# Patient Record
Sex: FEMALE | Race: WHITE | NOT HISPANIC OR LATINO | Employment: FULL TIME | URBAN - METROPOLITAN AREA
[De-identification: names, ages, dates, MRNs, and addresses within clinical notes are randomized per-mention and may not be internally consistent; named-entity substitution may affect disease eponyms.]

---

## 2017-01-09 ENCOUNTER — HOSPITAL ENCOUNTER (OUTPATIENT)
Dept: RADIOLOGY | Facility: MEDICAL CENTER | Age: 37
End: 2017-01-09
Attending: FAMILY MEDICINE
Payer: COMMERCIAL

## 2017-01-09 DIAGNOSIS — R51.9 HEADACHE, UNSPECIFIED HEADACHE TYPE: ICD-10-CM

## 2017-01-09 PROCEDURE — A9579 GAD-BASE MR CONTRAST NOS,1ML: HCPCS | Performed by: FAMILY MEDICINE

## 2017-01-09 PROCEDURE — 700117 HCHG RX CONTRAST REV CODE 255: Performed by: FAMILY MEDICINE

## 2017-01-09 PROCEDURE — 70553 MRI BRAIN STEM W/O & W/DYE: CPT

## 2017-01-09 RX ADMIN — GADODIAMIDE 15 ML: 287 INJECTION INTRAVENOUS at 13:56

## 2017-03-27 ENCOUNTER — OFFICE VISIT (OUTPATIENT)
Dept: NEUROLOGY | Facility: MEDICAL CENTER | Age: 37
End: 2017-03-27
Payer: COMMERCIAL

## 2017-03-27 VITALS
TEMPERATURE: 98.4 F | HEIGHT: 63 IN | OXYGEN SATURATION: 93 % | DIASTOLIC BLOOD PRESSURE: 62 MMHG | SYSTOLIC BLOOD PRESSURE: 100 MMHG | BODY MASS INDEX: 27.29 KG/M2 | WEIGHT: 154 LBS | HEART RATE: 77 BPM

## 2017-03-27 DIAGNOSIS — H53.19: Primary | ICD-10-CM

## 2017-03-27 DIAGNOSIS — R20.2 PARESTHESIA OF BOTH HANDS: ICD-10-CM

## 2017-03-27 PROCEDURE — 99244 OFF/OP CNSLTJ NEW/EST MOD 40: CPT | Performed by: PSYCHIATRY & NEUROLOGY

## 2017-03-27 ASSESSMENT — ENCOUNTER SYMPTOMS
FOCAL WEAKNESS: 0
BLURRED VISION: 1
TINGLING: 1
SENSORY CHANGE: 1
PHOTOPHOBIA: 0
LOSS OF CONSCIOUSNESS: 0
HEADACHES: 0
DOUBLE VISION: 0

## 2017-03-27 NOTE — MR AVS SNAPSHOT
"        Gasper Live   3/27/2017 3:20 PM   Office Visit   MRN: [id number]    Department:  [facility]   Dept Phone:  [phone]    Description:  Female : 1980   Provider:  Braulio Da Silva M.D.           Reason for Visit     New Patient headache, numbness and tingling in hands and back      Allergies as of 3/27/2017     Allergen Noted Reactions    Nkda [No Known Drug Allergy] 2007         Vital Signs     Blood Pressure Pulse Temperature Height Weight Body Mass Index    100/62 mmHg 77 36.9 °C (98.4 °F) 1.6 m (5' 2.99\") 69.854 kg (154 lb) 27.29 kg/m2    Oxygen Saturation Smoking Status                93% Never Smoker           Basic Information     Date Of Birth Sex Race Ethnicity Preferred Language    1980 Female White Non- English      Problem List              ICD-10-CM Priority Class Noted - Resolved    Uterine polyp N84.0   2015 - Present    Twin dichorionic diamniotic placenta O30.049   2016 - Present    Polyhydramnios O40.9XX0   2016 - Present    Premature labor O60.00   2016 - Present     premature rupture of membranes in third trimester O42.913   2016 - Present    Multiple gestation with malpresentation of one fetus or more, delivered O30.90, O32.9XX0   2016 - Present    Benign gestational thrombocytopenia in third trimester (CMS-HCC) O99.113, D69.6   2016 - Present      Health Maintenance        Date Due Completion Dates    IMM DTaP/Tdap/Td Vaccine (2 - Td) 2018    PAP SMEAR 2019, 2015            Current Immunizations     Hep A/HEP B Combined Vaccine (TwinRix) 2010, 2008    Hepatitis B Vaccine Non-Recombivax (Ped/Adol) 2011 11:30 AM    Influenza TIV (IM) 10/30/2013, 2013    Influenza Vaccine Quad Inj (Pf) 2016    Influenza Vaccine Quad Inj (Preserved) 10/13/2015, 2014    MMR Vaccine 1995, 1981    MMR/Varicella Combined Vaccine  Incomplete    Tdap Vaccine  " Incomplete, 9/2/2008    Tuberculin Skin Test 3/19/2014  7:15 AM, 4/24/2013 12:20 PM, 5/7/2012 12:30 PM, 5/9/2011  3:45 PM      Below and/or attached are the medications your provider expects you to take. Review all of your home medications and newly ordered medications with your provider and/or pharmacist. Follow medication instructions as directed by your provider and/or pharmacist. Please keep your medication list with you and share with your provider. Update the information when medications are discontinued, doses are changed, or new medications (including over-the-counter products) are added; and carry medication information at all times in the event of emergency situations     Allergies:  NKDA - (reactions not documented)               Medications  Valid as of: March 27, 2017 -  4:07 PM    Generic Name Brand Name Tablet Size Instructions for use    Acetaminophen (Tab) Acetaminophen 650 MG Take 650 mg by mouth every four hours as needed for Fever (> 100.4 F).        Docusate Sodium (Cap)  MG Take 100 mg by mouth 2 times a day as needed for Constipation.        Ferrous Sulfate (Tab) ferrous sulfate 325 (65 FE) MG Take 1 Tab by mouth 2 times a day, with meals.        Levothyroxine Sodium (Tab) SYNTHROID 50 MCG Take 1 Tab by mouth every day.        Multiple Vitamins-Minerals   Take  by mouth every day.        .                 Medicines prescribed today were sent to:     [facility] #[id number] [place], [place] - [address]    [address] [place] [address]    Phone: [phone] Fax: [phone]    Open 24 Hours?: No      Medication refill instructions:       If your prescription bottle indicates you have medication refills left, it is not necessary to call your provider’s office. Please contact your pharmacy and they will refill your medication.    If your prescription bottle indicates you do not have any refills left, you may request refills at any time through one of the following  ways: The online [facility] system (except Urgent Care), by calling your provider’s office, or by asking your pharmacy to contact your provider’s office with a refill request. Medication refills are processed only during regular business hours and may not be available until the next business day. Your provider may request additional information or to have a follow-up visit with you prior to refilling your medication.   *Please Note: Medication refills are assigned a new Rx number when refilled electronically. Your pharmacy may indicate that no refills were authorized even though a new prescription for the same medication is available at the pharmacy. Please request the medicine by name with the pharmacy before contacting your provider for a refill.           [facility] Access Code: Activation code not generated  Current [facility] Status: Active

## 2017-03-27 NOTE — PROGRESS NOTES
"Subjective:      Gasper Live is a 37 y.o. female who presents from the office of , for consultation, with a history of transient episodes of visual distortion and head discomfort, and separate episodes of bilateral hand paresthesias.     HPI    Patient is a pleasant 37-year-old right-handed  female whose episodes of visual distortion and head discomfort began about 2 or 3 years ago, but which had increased in frequency over the last month. Between the episodes she is in her usual state of health without sequelae. She describes a sudden onset of visual distortions as scotomata and floaters. There is no visual loss. It appears to be binocular. After 30 minutes she describes a mild \"brain freeze\" with some head discomfort, not an actual headache, with heightened sensitivity to light and some mildly impaired concentration. She never loses consciousness. The whole episode from start to finish last about one hour. There are no sequelae. One thing first started, they actually resolved spontaneously but then started up again about 9 months ago. She has been averaging a once a month frequency, but over the last week she actually had 3 of these events. There is no diurnal pattern to when they begin, she has not been able to identify triggers. Her workup included MRI scan of the brain with and without contrast, I reviewed the images myself, these were completely within normal limits, revealing only a retention cyst in the maxillary sinus.    The hand paresthesias started separately, almost 5 years ago. She is aware of the hands and fingers bilaterally as being involved, quite often in the morning hours. She awakens, shakes things out, and they improve. There is no actual weakness. This can be associated with some numbness just inferior to the right shoulder blade. There was no actual neck pain with symptoms radiating into either arm and she denies Lhermitte phenomena. Weightlifting also seems to make " the hand symptoms more intense. She denies sensory loss, loss of strength or dexterity. The symptoms occur independently of the visual episodes as described above. These are fluctuating in occurrence, they don't seem to have an escalating pattern to them. She denies any sensory distortions in the lower extremities. There is no pain or swelling at the hands or wrists and elbows bilaterally. There has been no directed workup.    She has a history of migraine headaches in the distant past, these were not associated with any aura symptomatology. They started in college, never triggered by certain circumstance consistently, there is never a diurnal pattern to when they did occur. They typically lasted one day, would always resolve. She has hypothyroidism, otherwise no history of diabetes, malignancy, hypertension, CAD, PVD, CVA, demyelinating disease, neurodegenerative disease, autoimmune disease, liver or kidney disease, asthma, blood dyscrasia or psychiatric disease. There is no surgical history of note. She knows little of her father's medical history, her mother is alive and well. Her one sister is also alive and well. No one in the family has a history of MS or migraine headaches that she is aware of. Her menses are about once every 6 weeks, they last 4 days. She rarely drinks alcohol, does not smoke. She works as an ICU nurse. Her , Trevin, is also an ICU nurse. About 9 months ago she gave birth to fraternal twins. During the pregnancy she was not aware if any of the symptoms she is seeing me for today were worsened.    She is on Synthroid, iron supplement and multivitamin daily.    Review of Systems   Constitutional: Negative for malaise/fatigue.   Eyes: Positive for blurred vision. Negative for double vision and photophobia.   Neurological: Positive for tingling and sensory change. Negative for focal weakness, loss of consciousness and headaches.   All other systems reviewed and are negative.        "Objective:     /62 mmHg  Pulse 77  Temp(Src) 36.9 °C (98.4 °F)  Ht 1.6 m (5' 2.99\")  Wt 69.854 kg (154 lb)  BMI 27.29 kg/m2  SpO2 93%     Physical Exam    She appears in no acute distress. Vital signs are stable. There is no malar rash, jaw or temporal tenderness, jaw claudication, or allodynia. The neck is supple, carotid pulses are present bilaterally without asymmetry or bruits. Cardiac evaluation reveals a regular rhythm. There is minimal tenderness over the medial epicondyles at both elbows, there is no tenderness of the wrists, compression maneuvers at the wrists are also negative bilaterally. There is no evidence of bruising, rash, or edema.    Fully oriented, there is no aphasia, agnosia, apraxia, or inattention.    PERRLA/EOMI, visual fields are full to finger counting on confrontation both eyes, funduscopic exam reveals sharp disc margins bilaterally, facial movements are symmetric, jaw jerk is absent, the tendon uvula midline, sensory exam is intact across the midline to light touch and temperature. Shoulder shrug is symmetric.    Tone is normal, there is no tremor, asterixis, or drift. Strength is 5/5 in all muscle groups with attention paid to the hand intrinsic musculatures bilaterally. There is no atrophy. Reflexes are brisk but symmetric, present at all points in all 4 extremities, both toes are downgoing.    Repetitive movements with the hands, fingers and feet are intact and symmetric with maintain amplitude and frequencies. There is no appendicular dystaxia. She walks with normal station, arm swing is symmetric, heel, toe, and tandem walking are intact.    Sensory exam is intact to vibration and temperature, Romberg is absent.     Assessment/Plan:     1. Other visual distortions and entoptic phenomena  I suspect these are migrainous epiphenomena, they are not harbingers of demyelinating disease, and MS is even less likely within normal imaging study. What is a little unusual is the " absence of a clear history of migraine with aura though she does have a history of migraine headaches in the past, and they were infrequent as well. For now, I would simply recommend observation. I don't think a consultation with an ophthalmologist is necessary. I doubt that these visual episodes or manifestation of some systemic vasculitic or autoimmune process, though I would need to defer further workup to her primary care physician if it was deemed appropriate. If migraine, symptomatic relief can be offered moving forwards, but she would like to hold on this. As well, I don't think we are seeing manifestations of occipital lobe epilepsy, vertebrobasilar steal, etc.    2. Paresthesia of both hands  I suspect this is more a focal compression process at the wrists, likely median neuritis, though I suppose a bilateral cervical radiculopathic process could be entertained. The symptoms are minimal, there are no abnormalities on exam, so neurophysiologic studies are not likely to prove abnormal. Treatment recommendation would still be the same and would include hand therapies, anti-inflammatories, oral steroids, etc. For now I don't think these are warranted. The fact that symptoms awaken her from sleep are consistent with a compression process at the wrists, she is at risk given the nature of her work as a nurse, also because she does weight lift. There is nothing on exam to suggest she has a medical condition that would predispose to compression neuropathy, such as autoimmune disease, diabetes, thyroid disease, malignancy, etc., she is not showing signs of a radiculomyelopathy as well, to imaging of the cervical spine can be held.    Face-to-face time was spent with the patient reviewing all of the above. For now we will simply follow along, she can call the office into the future depending on need and symptom recurrence/progression.  Time: Evaluation of 60 minutes for exam, review, discussion, education  Discussion:  As mentioned in the assessment, over 50% of the time spent face-to-face counseling and coordinating care

## 2017-07-03 ENCOUNTER — HOSPITAL ENCOUNTER (OUTPATIENT)
Dept: RADIOLOGY | Facility: MEDICAL CENTER | Age: 37
End: 2017-07-03
Attending: FAMILY MEDICINE
Payer: COMMERCIAL

## 2017-07-03 DIAGNOSIS — Z12.31 VISIT FOR SCREENING MAMMOGRAM: ICD-10-CM

## 2017-07-03 PROCEDURE — 77063 BREAST TOMOSYNTHESIS BI: CPT

## 2017-09-20 ENCOUNTER — EH NON-PROVIDER (OUTPATIENT)
Dept: OCCUPATIONAL MEDICINE | Facility: CLINIC | Age: 37
End: 2017-09-20

## 2017-09-20 DIAGNOSIS — Z29.89 NEED FOR ISOLATION: ICD-10-CM

## 2017-09-20 PROCEDURE — 94375 RESPIRATORY FLOW VOLUME LOOP: CPT

## 2017-11-09 ENCOUNTER — OFFICE VISIT (OUTPATIENT)
Dept: URGENT CARE | Facility: CLINIC | Age: 37
End: 2017-11-09
Payer: COMMERCIAL

## 2017-11-09 VITALS
TEMPERATURE: 99.5 F | OXYGEN SATURATION: 100 % | SYSTOLIC BLOOD PRESSURE: 106 MMHG | RESPIRATION RATE: 16 BRPM | HEIGHT: 62 IN | WEIGHT: 155 LBS | HEART RATE: 84 BPM | DIASTOLIC BLOOD PRESSURE: 60 MMHG | BODY MASS INDEX: 28.52 KG/M2

## 2017-11-09 DIAGNOSIS — H66.002 ACUTE SUPPURATIVE OTITIS MEDIA OF LEFT EAR WITHOUT SPONTANEOUS RUPTURE OF TYMPANIC MEMBRANE, RECURRENCE NOT SPECIFIED: ICD-10-CM

## 2017-11-09 PROCEDURE — 99214 OFFICE O/P EST MOD 30 MIN: CPT | Performed by: PHYSICIAN ASSISTANT

## 2017-11-09 RX ORDER — AMOXICILLIN AND CLAVULANATE POTASSIUM 875; 125 MG/1; MG/1
1 TABLET, FILM COATED ORAL 2 TIMES DAILY
Qty: 14 TAB | Refills: 0 | Status: SHIPPED | OUTPATIENT
Start: 2017-11-09 | End: 2017-11-16

## 2017-11-09 RX ORDER — HYDROCODONE BITARTRATE AND ACETAMINOPHEN 5; 325 MG/1; MG/1
1-2 TABLET ORAL EVERY 4 HOURS PRN
Qty: 10 TAB | Refills: 0 | Status: SHIPPED | OUTPATIENT
Start: 2017-11-09 | End: 2018-04-12

## 2017-11-09 ASSESSMENT — ENCOUNTER SYMPTOMS
SORE THROAT: 1
SHORTNESS OF BREATH: 0
PALPITATIONS: 0
EYE PAIN: 0
COUGH: 0
CHILLS: 0
FEVER: 0

## 2017-11-09 ASSESSMENT — PAIN SCALES - GENERAL: PAINLEVEL: 10=SEVERE PAIN

## 2017-11-09 NOTE — LETTER
November 9, 2017         Patient: Gasper Live   YOB: 1980   Date of Visit: 11/9/2017           To Whom it May Concern:    Gasper Live was seen in my clinic on 11/9/2017. Please excuse her from work 11/9-11/11/2017.    If you have any questions or concerns, please don't hesitate to call.        Sincerely,           Usman Marion P.A.-C.  Electronically Signed

## 2017-11-09 NOTE — PROGRESS NOTES
Subjective:      Gasper Live is a 37 y.o. female who presents with Otalgia (has been fighting a could x 1 week and ear pain began today, chills and bodyaches )            Otalgia    There is pain in the left ear. This is a new problem. The current episode started today. The problem occurs constantly. The problem has been unchanged. The pain is at a severity of 10/10. Associated symptoms include a sore throat. Pertinent negatives include no coughing, ear discharge or hearing loss. She has tried NSAIDs for the symptoms. The treatment provided mild relief.       Review of Systems   Constitutional: Negative for chills and fever.   HENT: Positive for congestion, ear pain and sore throat. Negative for ear discharge, hearing loss and tinnitus.    Eyes: Negative for pain.   Respiratory: Negative for cough and shortness of breath.    Cardiovascular: Negative for chest pain and palpitations.   All other systems reviewed and are negative.    PMH:  has a past medical history of Anemia; Benign gestational thrombocytopenia in third trimester (CMS-HCC) (7/6/2016); and Disorder of thyroid.  MEDS:   Current Outpatient Prescriptions:   •  amoxicillin-clavulanate (AUGMENTIN) 875-125 MG Tab, Take 1 Tab by mouth 2 times a day for 7 days., Disp: 14 Tab, Rfl: 0  •  hydrocodone-acetaminophen (NORCO) 5-325 MG Tab per tablet, Take 1-2 Tabs by mouth every four hours as needed., Disp: 10 Tab, Rfl: 0  •  levothyroxine (SYNTHROID) 50 MCG Tab, Take 1 Tab by mouth every day., Disp: 90 Tab, Rfl: 3  •  Multiple Vitamins-Minerals (MULTIVITAMIN PO), Take  by mouth every day., Disp: , Rfl:   •  acetaminophen 650 MG Tab, Take 650 mg by mouth every four hours as needed for Fever (> 100.4 F)., Disp: 30 Tab, Rfl: 0  •  ferrous sulfate 325 (65 FE) MG tablet, Take 1 Tab by mouth 2 times a day, with meals., Disp: 30 Tab, Rfl: 0  •  docusate sodium 100 MG Cap, Take 100 mg by mouth 2 times a day as needed for Constipation., Disp: 60 Cap, Rfl:  "3  ALLERGIES:   Allergies   Allergen Reactions   • Nkda [No Known Drug Allergy]      SURGHX:   Past Surgical History:   Procedure Laterality Date   • PRIMARY C SECTION  7/2/2016    Procedure: PRIMARY C SECTION;  Surgeon: Zoë Leiva M.D.;  Location: LABOR AND DELIVERY;  Service:    • HYSTEROSCOPY WITH VIDEO DIAGNOSTIC  11/5/2015    Procedure: HYSTEROSCOPY WITH VIDEO DIAGNOSTIC with polypectomy;  Surgeon: Arpit Gorman M.D.;  Location: SURGERY [facility];  Service:      SOCHX:  reports that she has never smoked. She has never used smokeless tobacco. She reports that she drinks about 1.2 oz of alcohol per week . She reports that she does not use drugs.  FH: Family history was reviewed, no pertinent findings to report  Medications, Allergies, and current problem list reviewed today in Epic       Objective:     /60   Pulse 84   Temp 37.5 °C (99.5 °F)   Resp 16   Ht 1.575 m (5' 2\")   Wt 70.3 kg (155 lb)   SpO2 100%   Breastfeeding? No   BMI 28.35 kg/m²      Physical Exam   Constitutional: She is oriented to person, place, and time. She appears well-developed and well-nourished. She is active.  Non-toxic appearance. She does not have a sickly appearance. She does not appear ill. No distress.   HENT:   Head: Normocephalic and atraumatic.   Right Ear: Hearing, tympanic membrane, external ear and ear canal normal.   Left Ear: Hearing, external ear and ear canal normal. Tympanic membrane is erythematous and bulging.   Nose: Nose normal.   Mouth/Throat: Uvula is midline, oropharynx is clear and moist and mucous membranes are normal.   Eyes: Conjunctivae and lids are normal. Right eye exhibits no discharge. Left eye exhibits no discharge.   Neck: Normal range of motion. Neck supple.   Cardiovascular: Normal rate, regular rhythm and normal heart sounds.  Exam reveals no gallop and no friction rub.    No murmur heard.  Pulmonary/Chest: Effort normal and breath sounds normal. No respiratory " distress. She has no decreased breath sounds. She has no wheezes. She has no rhonchi. She has no rales. She exhibits no tenderness.   Musculoskeletal: Normal range of motion.   Neurological: She is alert and oriented to person, place, and time.   Skin: Skin is warm, dry and intact.   Psychiatric: She has a normal mood and affect. Her speech is normal and behavior is normal. Judgment and thought content normal.   Vitals reviewed.              Assessment/Plan:     1. Acute suppurative otitis media of left ear without spontaneous rupture of tympanic membrane, recurrence not specified    - amoxicillin-clavulanate (AUGMENTIN) 875-125 MG Tab; Take 1 Tab by mouth 2 times a day for 7 days.  Dispense: 14 Tab; Refill: 0  - hydrocodone-acetaminophen (NORCO) 5-325 MG Tab per tablet; Take 1-2 Tabs by mouth every four hours as needed.  Dispense: 10 Tab; Refill: 0    Differential diagnosis, natural history, supportive care, and indications for immediate follow-up discussed at length.   Follow-up with primary care provider within 4-5 days, emergency room precautions discussed.  Patient and/or family appears understanding of information.

## 2017-11-09 NOTE — PATIENT INSTRUCTIONS

## 2017-11-22 ENCOUNTER — EH NON-PROVIDER (OUTPATIENT)
Dept: OCCUPATIONAL MEDICINE | Facility: CLINIC | Age: 37
End: 2017-11-22

## 2017-11-22 DIAGNOSIS — Z23 NEED FOR VACCINATION: ICD-10-CM

## 2017-11-22 PROCEDURE — 90686 IIV4 VACC NO PRSV 0.5 ML IM: CPT | Performed by: PREVENTIVE MEDICINE

## 2018-01-10 ENCOUNTER — HOSPITAL ENCOUNTER (OUTPATIENT)
Facility: MEDICAL CENTER | Age: 38
End: 2018-01-10
Attending: OBSTETRICS & GYNECOLOGY
Payer: COMMERCIAL

## 2018-01-10 PROCEDURE — 87624 HPV HI-RISK TYP POOLED RSLT: CPT

## 2018-01-10 PROCEDURE — 88175 CYTOPATH C/V AUTO FLUID REDO: CPT

## 2018-01-16 LAB — CYTOLOGY REG CYTOL: NORMAL

## 2018-01-19 LAB
HPV HR 12 DNA CVX QL NAA+PROBE: NEGATIVE
HPV16 DNA SPEC QL NAA+PROBE: NEGATIVE
HPV18 DNA SPEC QL NAA+PROBE: NEGATIVE
SPECIMEN SOURCE: NORMAL

## 2018-01-25 ENCOUNTER — HOSPITAL ENCOUNTER (OUTPATIENT)
Facility: MEDICAL CENTER | Age: 38
End: 2018-01-25
Payer: COMMERCIAL

## 2018-01-25 LAB
EST. AVERAGE GLUCOSE BLD GHB EST-MCNC: 103 MG/DL
HBA1C MFR BLD: 5.2 % (ref 0–5.6)

## 2018-01-25 PROCEDURE — 80061 LIPID PANEL: CPT

## 2018-01-25 PROCEDURE — 82947 ASSAY GLUCOSE BLOOD QUANT: CPT

## 2018-01-25 PROCEDURE — 83036 HEMOGLOBIN GLYCOSYLATED A1C: CPT

## 2018-01-25 PROCEDURE — 83695 ASSAY OF LIPOPROTEIN(A): CPT

## 2018-01-25 PROCEDURE — 83704 LIPOPROTEIN BLD QUAN PART: CPT

## 2018-01-26 LAB
CHOLEST SERPL-MCNC: 183 MG/DL (ref 100–199)
GLUCOSE SERPL-MCNC: 88 MG/DL (ref 65–99)
HDLC SERPL-MCNC: 65 MG/DL
LDLC SERPL CALC-MCNC: 105 MG/DL
TRIGL SERPL-MCNC: 65 MG/DL (ref 0–149)

## 2018-01-27 LAB — LPA SERPL-MCNC: 10 MG/DL

## 2018-01-31 LAB
CHOLEST SERPL-MCNC: 195 MG/DL
HDL PARTICAL NO Q4363: 40.8 UMOL/L
HDL SIZE Q4361: 9.2 NM
HDLC SERPL-MCNC: 71 MG/DL (ref 40–59)
HLD.LARGE SERPL-SCNC: 9 UMOL/L
L VLDL PART NO Q4357: <1.5 NMOL/L
LDL SERPL QN: 21.2 NM
LDL SERPL-SCNC: 1349 NMOL/L
LDL SMALL SERPL-SCNC: 332 NMOL/L
LDLC SERPL CALC-MCNC: 109 MG/DL
PATHOLOGY STUDY: ABNORMAL
TRIGL SERPL-MCNC: 75 MG/DL (ref 30–149)
VLDL SIZE Q4362: 44.3 NM

## 2018-02-06 ENCOUNTER — TELEPHONE (OUTPATIENT)
Dept: NEUROLOGY | Facility: MEDICAL CENTER | Age: 38
End: 2018-02-06

## 2018-02-06 NOTE — TELEPHONE ENCOUNTER
Received a phone call from patient for Dr. Da Silva stating she would like to try Imitrex for her migraines. She has not been seen since March 2017. I booked her for an appointment and she is currently scheduled to see the doctor in April. Is this something we can assist her with in the interim or does she need to wait for her appointment in April? Please advise.

## 2018-03-14 ENCOUNTER — OFFICE VISIT (OUTPATIENT)
Dept: URGENT CARE | Facility: CLINIC | Age: 38
End: 2018-03-14
Payer: COMMERCIAL

## 2018-03-14 VITALS
SYSTOLIC BLOOD PRESSURE: 100 MMHG | TEMPERATURE: 97.8 F | HEIGHT: 62 IN | DIASTOLIC BLOOD PRESSURE: 68 MMHG | BODY MASS INDEX: 28.52 KG/M2 | WEIGHT: 155 LBS | OXYGEN SATURATION: 99 % | HEART RATE: 67 BPM | RESPIRATION RATE: 14 BRPM

## 2018-03-14 DIAGNOSIS — J06.9 VIRAL URI: ICD-10-CM

## 2018-03-14 DIAGNOSIS — J00 NASOPHARYNGITIS: ICD-10-CM

## 2018-03-14 PROCEDURE — 99213 OFFICE O/P EST LOW 20 MIN: CPT | Performed by: NURSE PRACTITIONER

## 2018-03-14 ASSESSMENT — PATIENT HEALTH QUESTIONNAIRE - PHQ9: CLINICAL INTERPRETATION OF PHQ2 SCORE: 0

## 2018-03-14 ASSESSMENT — ENCOUNTER SYMPTOMS
FEVER: 0
COUGH: 1
SINUS PRESSURE: 1
SORE THROAT: 1

## 2018-03-14 NOTE — PROGRESS NOTES
"Subjective:      Gasper Live is a 37 y.o. female who presents with Sinus Problem            Sinus Problem   This is a new problem. Episode onset: Pt reports she developed sinus congestion and mild ST 3 days ago. She is only coughing a little. Denies any recent fevers. She is fatigued. The problem is unchanged. There has been no fever. Associated symptoms include congestion, coughing, sinus pressure and a sore throat. Treatments tried: OTC mucinex and DayQuil. The treatment provided no relief.       Review of Systems   Constitutional: Positive for malaise/fatigue. Negative for fever.   HENT: Positive for congestion, sinus pressure and sore throat.    Respiratory: Positive for cough.    All other systems reviewed and are negative.    Past Medical History:   Diagnosis Date   • Anemia    • Benign gestational thrombocytopenia in third trimester (CMS-HCC) 7/6/2016   • Disorder of thyroid       Past Surgical History:   Procedure Laterality Date   • PRIMARY C SECTION  7/2/2016    Procedure: PRIMARY C SECTION;  Surgeon: Zoë Leiva M.D.;  Location: LABOR AND DELIVERY;  Service:    • HYSTEROSCOPY WITH VIDEO DIAGNOSTIC  11/5/2015    Procedure: HYSTEROSCOPY WITH VIDEO DIAGNOSTIC with polypectomy;  Surgeon: Arpit Gorman M.D.;  Location: SURGERY [facility];  Service:       Social History     Social History   • Marital status: Single     Spouse name: N/A   • Number of children: N/A   • Years of education: N/A     Occupational History   • Not on file.     Social History Main Topics   • Smoking status: Never Smoker   • Smokeless tobacco: Never Used   • Alcohol use 1.2 oz/week     2 Standard drinks or equivalent per week   • Drug use: No   • Sexual activity: Yes     Partners: Male     Other Topics Concern   • Not on file     Social History Narrative   • No narrative on file          Objective:     /68   Pulse 67   Temp 36.6 °C (97.8 °F)   Resp 14   Ht 1.575 m (5' 2\")   Wt 70.3 kg (155 lb)   SpO2 " 99%   BMI 28.35 kg/m²      Physical Exam   Constitutional: She is oriented to person, place, and time. Vital signs are normal. She appears well-developed and well-nourished.   HENT:   Head: Normocephalic and atraumatic.   Right Ear: Tympanic membrane and external ear normal.   Left Ear: Tympanic membrane and external ear normal.   Nose: Rhinorrhea present.   Mouth/Throat: Oropharynx is clear and moist.   Eyes: EOM are normal. Pupils are equal, round, and reactive to light.   Neck: Normal range of motion.   Cardiovascular: Normal rate and regular rhythm.    Pulmonary/Chest: Effort normal and breath sounds normal.   Musculoskeletal: Normal range of motion.   Lymphadenopathy:        Head (right side): Submandibular adenopathy present.        Head (left side): Submandibular adenopathy present.   Neurological: She is alert and oriented to person, place, and time.   Skin: Skin is warm and dry. Capillary refill takes less than 2 seconds.   Psychiatric: She has a normal mood and affect. Her speech is normal and behavior is normal. Thought content normal.   Vitals reviewed.              Assessment/Plan:     1. Viral URI    2. Nasopharyngitis    Discussed with patient symptoms are viral in nature. She needs to continue OTC therapies for at least another 7 days before her symptoms would be consistent with any bacterial infection, she understands  Increase water intake  Plenty of rest  Tylenol and Ibuprofen PRN pain  Supportive care, differential diagnoses, and indications for immediate follow-up discussed with patient.    Pathogenesis of diagnosis discussed including typical length and natural progression.      Instructed to return to  or nearest emergency department if symptoms fail to improve, for any change in condition, further concerns, or new concerning symptoms.  Patient states understanding of the plan of care and discharge instructions.

## 2018-03-27 ENCOUNTER — HOSPITAL ENCOUNTER (OUTPATIENT)
Facility: MEDICAL CENTER | Age: 38
End: 2018-03-27
Attending: OBSTETRICS & GYNECOLOGY
Payer: COMMERCIAL

## 2018-03-27 PROCEDURE — 88305 TISSUE EXAM BY PATHOLOGIST: CPT

## 2018-04-12 ENCOUNTER — OFFICE VISIT (OUTPATIENT)
Dept: NEUROLOGY | Facility: MEDICAL CENTER | Age: 38
End: 2018-04-12
Payer: COMMERCIAL

## 2018-04-12 VITALS
OXYGEN SATURATION: 98 % | WEIGHT: 148.81 LBS | TEMPERATURE: 98 F | HEART RATE: 73 BPM | BODY MASS INDEX: 28.1 KG/M2 | SYSTOLIC BLOOD PRESSURE: 102 MMHG | RESPIRATION RATE: 16 BRPM | DIASTOLIC BLOOD PRESSURE: 58 MMHG | HEIGHT: 61 IN

## 2018-04-12 DIAGNOSIS — G43.109 MIGRAINE WITH AURA AND WITHOUT STATUS MIGRAINOSUS, NOT INTRACTABLE: Primary | ICD-10-CM

## 2018-04-12 PROCEDURE — 99214 OFFICE O/P EST MOD 30 MIN: CPT | Performed by: PSYCHIATRY & NEUROLOGY

## 2018-04-12 RX ORDER — ELETRIPTAN HYDROBROMIDE 40 MG/1
TABLET, FILM COATED ORAL
Qty: 9 TAB | Refills: 4 | Status: SHIPPED | OUTPATIENT
Start: 2018-04-12 | End: 2018-10-15

## 2018-04-12 ASSESSMENT — ENCOUNTER SYMPTOMS
CONSTIPATION: 0
MEMORY LOSS: 0
PHOTOPHOBIA: 0
SENSORY CHANGE: 0
DEPRESSION: 0
LOSS OF CONSCIOUSNESS: 0
HEADACHES: 0

## 2018-04-12 ASSESSMENT — PAIN SCALES - GENERAL: PAINLEVEL: NO PAIN

## 2018-04-13 NOTE — PROGRESS NOTES
"Subjective:      Gasper Live is a 38 y.o. female who presents for follow-up with a history of migraine with aura.     HPI    Last seen more than one year ago, she had been having visual distortions lasting upwards of 30 minutes, these are unassociated with headache pain. She has a history of migraine without aura. It was suspected this was simply migrainous epiphenomena, workup for organic causes of visual distortions was ruled out, the rest were simply observing for, absent other indicators. In the last 4 months, she actually had an increase in headaches, now visual aura symptoms consistently lasting 30 minutes and then followed by severe migraine headache. The headaches last maybe a day or so, she was taking only OTC medicines. She was having at least one headache attack every week for the month of February, things have attenuated on their own, she has maybe had one headache attack in the last 6 weeks.    Medical, surgical and family histories are reviewed in electronic health record, there are no known drug allergies. She remains on Synthroid and multivitamins.    Review of Systems   Eyes: Negative for photophobia.   Gastrointestinal: Negative for constipation.   Neurological: Negative for sensory change, loss of consciousness and headaches.   Psychiatric/Behavioral: Negative for depression and memory loss.   All other systems reviewed and are negative.       Objective:     /58   Pulse 73   Temp 36.7 °C (98 °F)   Resp 16   Ht 1.549 m (5' 1\")   Wt 67.5 kg (148 lb 13 oz)   SpO2 98%   BMI 28.12 kg/m²      Physical Exam    She appears in no acute distress. Vital signs are stable. There is no malar rash. The neck is supple, range of motion is full, carotid pulses are present bilaterally without asymmetry. Cardiac evaluation reveals a regular rhythm. Including mental status, cranial nerves, musculoskeletal, coordination, and sensory evaluations, examination reveals no evidence of deficits.   "   Assessment/Plan:     1. Migraine with aura and without status migrainosus, not intractable  Fortunately, the headaches have spontaneously remitted, and though I might have recommended maintenance therapies, for now we can simply use rescue. Fortunately, her aura is fairly consistent with a 30 minute duration, I recommended waiting 15 minutes after onset, then taking Relpax, repeating that dose in an hour. Taking Relpax at the beginning of the aura will allow far too much time to transpire before the head pain begins, the drug already losing efficacy. Unfortunately, the only way to treat migraine with aura and to rid her of the aura symptoms themselves, is to take a maintenance drug. Rescues have no effect on shortening aura symptom duration.    Face-to-face time was spent reviewing all of the above. Relpax 40 mg of used as needed, this can be repeated in one hour, later if needed. For maintenance, I gave her a list of supplements that include Pentadolex, Feverfew, magnesium, riboflavin, niacin, melatonin and coenzyme Q 10. For now she will simply observe to see what pattern her headaches devolve into. She will call the office if things were to increase again, otherwise we can follow-up in 6 months.    - eletriptan (RELPAX) 40 MG tablet; 1 tab at headache onset; repeat in 1 hour prn  Dispense: 9 Tab; Refill: 4    Time: Evaluation of 25 minutes for exam come review, discussion, and education  Discussion: As mentioned in the assessment, over 70% of the time spent face-to-face counseling and coordination in care

## 2018-05-03 ENCOUNTER — HOSPITAL ENCOUNTER (OUTPATIENT)
Facility: MEDICAL CENTER | Age: 38
End: 2018-05-03
Attending: FAMILY MEDICINE
Payer: COMMERCIAL

## 2018-05-03 LAB
EST. AVERAGE GLUCOSE BLD GHB EST-MCNC: 108 MG/DL
GLUCOSE SERPL-MCNC: 93 MG/DL (ref 65–99)
HBA1C MFR BLD: 5.4 % (ref 0–5.6)

## 2018-05-05 LAB — LPA SERPL-MCNC: 14 MG/DL

## 2018-05-10 LAB
CHOLEST SERPL-MCNC: 190 MG/DL
HDL PARTICAL NO Q4363: 31 UMOL/L
HDL SIZE Q4361: 9.1 NM
HDLC SERPL-MCNC: 60 MG/DL (ref 40–59)
HLD.LARGE SERPL-SCNC: 7 UMOL/L
L VLDL PART NO Q4357: <1.5 NMOL/L
LDL SERPL QN: 20.9 NM
LDL SERPL-SCNC: 1305 NMOL/L
LDL SMALL SERPL-SCNC: 527 NMOL/L
LDLC SERPL CALC-MCNC: 111 MG/DL
PATHOLOGY STUDY: ABNORMAL
TRIGL SERPL-MCNC: 93 MG/DL (ref 30–149)
VLDL SIZE Q4362: 44.7 NM

## 2018-09-26 ENCOUNTER — DOCUMENTATION (OUTPATIENT)
Dept: OCCUPATIONAL MEDICINE | Facility: CLINIC | Age: 38
End: 2018-09-26

## 2018-09-27 ENCOUNTER — EH NON-PROVIDER (OUTPATIENT)
Dept: OCCUPATIONAL MEDICINE | Facility: CLINIC | Age: 38
End: 2018-09-27

## 2018-09-27 DIAGNOSIS — Z02.89 ENCOUNTER FOR OCCUPATIONAL HEALTH EXAMINATION INVOLVING RESPIRATOR: Primary | ICD-10-CM

## 2018-09-27 PROCEDURE — 94375 RESPIRATORY FLOW VOLUME LOOP: CPT | Performed by: PREVENTIVE MEDICINE

## 2018-10-03 ENCOUNTER — IMMUNIZATION (OUTPATIENT)
Dept: OCCUPATIONAL MEDICINE | Facility: CLINIC | Age: 38
End: 2018-10-03

## 2018-10-03 DIAGNOSIS — Z23 NEED FOR VACCINATION: ICD-10-CM

## 2018-10-03 PROCEDURE — 90686 IIV4 VACC NO PRSV 0.5 ML IM: CPT | Performed by: PREVENTIVE MEDICINE

## 2018-10-15 ENCOUNTER — APPOINTMENT (OUTPATIENT)
Dept: RADIOLOGY | Facility: MEDICAL CENTER | Age: 38
End: 2018-10-15
Attending: EMERGENCY MEDICINE
Payer: COMMERCIAL

## 2018-10-15 ENCOUNTER — HOSPITAL ENCOUNTER (EMERGENCY)
Facility: MEDICAL CENTER | Age: 38
End: 2018-10-15
Attending: EMERGENCY MEDICINE
Payer: COMMERCIAL

## 2018-10-15 VITALS
SYSTOLIC BLOOD PRESSURE: 117 MMHG | HEIGHT: 61 IN | OXYGEN SATURATION: 99 % | BODY MASS INDEX: 25.93 KG/M2 | WEIGHT: 137.35 LBS | DIASTOLIC BLOOD PRESSURE: 78 MMHG | RESPIRATION RATE: 18 BRPM | HEART RATE: 63 BPM | TEMPERATURE: 97.5 F

## 2018-10-15 DIAGNOSIS — R20.0 NUMBNESS: ICD-10-CM

## 2018-10-15 DIAGNOSIS — G43.509 PERSISTENT MIGRAINE AURA WITHOUT CEREBRAL INFARCTION AND WITHOUT STATUS MIGRAINOSUS, NOT INTRACTABLE: ICD-10-CM

## 2018-10-15 LAB
ANION GAP SERPL CALC-SCNC: 5 MMOL/L (ref 0–11.9)
BASOPHILS # BLD AUTO: 0.8 % (ref 0–1.8)
BASOPHILS # BLD: 0.06 K/UL (ref 0–0.12)
BUN SERPL-MCNC: 14 MG/DL (ref 8–22)
CALCIUM SERPL-MCNC: 8.9 MG/DL (ref 8.4–10.2)
CHLORIDE SERPL-SCNC: 106 MMOL/L (ref 96–112)
CO2 SERPL-SCNC: 25 MMOL/L (ref 20–33)
CREAT SERPL-MCNC: 0.89 MG/DL (ref 0.5–1.4)
EKG IMPRESSION: NORMAL
EOSINOPHIL # BLD AUTO: 0.14 K/UL (ref 0–0.51)
EOSINOPHIL NFR BLD: 1.8 % (ref 0–6.9)
ERYTHROCYTE [DISTWIDTH] IN BLOOD BY AUTOMATED COUNT: 43 FL (ref 35.9–50)
GLUCOSE SERPL-MCNC: 91 MG/DL (ref 65–99)
HCG SERPL QL: NEGATIVE
HCT VFR BLD AUTO: 36.5 % (ref 37–47)
HGB BLD-MCNC: 12.2 G/DL (ref 12–16)
IMM GRANULOCYTES # BLD AUTO: 0.01 K/UL (ref 0–0.11)
IMM GRANULOCYTES NFR BLD AUTO: 0.1 % (ref 0–0.9)
LYMPHOCYTES # BLD AUTO: 1.55 K/UL (ref 1–4.8)
LYMPHOCYTES NFR BLD: 20.2 % (ref 22–41)
MCH RBC QN AUTO: 31.9 PG (ref 27–33)
MCHC RBC AUTO-ENTMCNC: 33.4 G/DL (ref 33.6–35)
MCV RBC AUTO: 95.5 FL (ref 81.4–97.8)
MONOCYTES # BLD AUTO: 0.42 K/UL (ref 0–0.85)
MONOCYTES NFR BLD AUTO: 5.5 % (ref 0–13.4)
NEUTROPHILS # BLD AUTO: 5.51 K/UL (ref 2–7.15)
NEUTROPHILS NFR BLD: 71.6 % (ref 44–72)
NRBC # BLD AUTO: 0 K/UL
NRBC BLD-RTO: 0 /100 WBC
PLATELET # BLD AUTO: 168 K/UL (ref 164–446)
PMV BLD AUTO: 8.4 FL (ref 9–12.9)
POTASSIUM SERPL-SCNC: 3.5 MMOL/L (ref 3.6–5.5)
RBC # BLD AUTO: 3.82 M/UL (ref 4.2–5.4)
SODIUM SERPL-SCNC: 136 MMOL/L (ref 135–145)
WBC # BLD AUTO: 7.7 K/UL (ref 4.8–10.8)

## 2018-10-15 PROCEDURE — 96374 THER/PROPH/DIAG INJ IV PUSH: CPT

## 2018-10-15 PROCEDURE — 70496 CT ANGIOGRAPHY HEAD: CPT

## 2018-10-15 PROCEDURE — 85025 COMPLETE CBC W/AUTO DIFF WBC: CPT

## 2018-10-15 PROCEDURE — 700105 HCHG RX REV CODE 258: Performed by: EMERGENCY MEDICINE

## 2018-10-15 PROCEDURE — 96375 TX/PRO/DX INJ NEW DRUG ADDON: CPT

## 2018-10-15 PROCEDURE — 84703 CHORIONIC GONADOTROPIN ASSAY: CPT

## 2018-10-15 PROCEDURE — 700111 HCHG RX REV CODE 636 W/ 250 OVERRIDE (IP): Performed by: EMERGENCY MEDICINE

## 2018-10-15 PROCEDURE — 36415 COLL VENOUS BLD VENIPUNCTURE: CPT

## 2018-10-15 PROCEDURE — 70498 CT ANGIOGRAPHY NECK: CPT

## 2018-10-15 PROCEDURE — 93005 ELECTROCARDIOGRAM TRACING: CPT | Performed by: EMERGENCY MEDICINE

## 2018-10-15 PROCEDURE — 99284 EMERGENCY DEPT VISIT MOD MDM: CPT

## 2018-10-15 PROCEDURE — 80048 BASIC METABOLIC PNL TOTAL CA: CPT

## 2018-10-15 PROCEDURE — 700117 HCHG RX CONTRAST REV CODE 255: Performed by: EMERGENCY MEDICINE

## 2018-10-15 RX ORDER — CHLORAL HYDRATE 500 MG
1000 CAPSULE ORAL DAILY
COMMUNITY

## 2018-10-15 RX ORDER — ONDANSETRON 2 MG/ML
4 INJECTION INTRAMUSCULAR; INTRAVENOUS ONCE
Status: COMPLETED | OUTPATIENT
Start: 2018-10-15 | End: 2018-10-15

## 2018-10-15 RX ORDER — KETOROLAC TROMETHAMINE 30 MG/ML
30 INJECTION, SOLUTION INTRAMUSCULAR; INTRAVENOUS ONCE
Status: COMPLETED | OUTPATIENT
Start: 2018-10-15 | End: 2018-10-15

## 2018-10-15 RX ORDER — METOCLOPRAMIDE HYDROCHLORIDE 5 MG/ML
10 INJECTION INTRAMUSCULAR; INTRAVENOUS ONCE
Status: COMPLETED | OUTPATIENT
Start: 2018-10-15 | End: 2018-10-15

## 2018-10-15 RX ORDER — ASPIRIN 81 MG/1
81 TABLET, CHEWABLE ORAL DAILY
Qty: 100 TAB | Refills: 0 | Status: SHIPPED | OUTPATIENT
Start: 2018-10-15 | End: 2018-11-27

## 2018-10-15 RX ORDER — PYRIDOXINE HCL (VITAMIN B6) 50 MG
50 TABLET ORAL DAILY
Status: SHIPPED | COMMUNITY
End: 2018-11-27

## 2018-10-15 RX ORDER — SODIUM CHLORIDE 9 MG/ML
1000 INJECTION, SOLUTION INTRAVENOUS ONCE
Status: COMPLETED | OUTPATIENT
Start: 2018-10-15 | End: 2018-10-15

## 2018-10-15 RX ADMIN — KETOROLAC TROMETHAMINE 30 MG: 30 INJECTION, SOLUTION INTRAMUSCULAR at 16:51

## 2018-10-15 RX ADMIN — ONDANSETRON 4 MG: 2 INJECTION INTRAMUSCULAR; INTRAVENOUS at 16:51

## 2018-10-15 RX ADMIN — METOCLOPRAMIDE 10 MG: 5 INJECTION, SOLUTION INTRAMUSCULAR; INTRAVENOUS at 16:51

## 2018-10-15 RX ADMIN — IOHEXOL 100 ML: 350 INJECTION, SOLUTION INTRAVENOUS at 17:32

## 2018-10-15 RX ADMIN — SODIUM CHLORIDE 1000 ML: 9 INJECTION, SOLUTION INTRAVENOUS at 15:45

## 2018-10-15 ASSESSMENT — PAIN SCALES - GENERAL: PAINLEVEL_OUTOF10: 1

## 2018-10-15 NOTE — ED TRIAGE NOTES
Pt states around 10am at work. Started getting a migraine. States around 1300 started getting numbness and tingling in L arm and hand, felt like her train of thought was off. States it lasted about 15 mins. Pt has hx of migraines but has never had episode of numbness/tingling before. Denies CP or SOB

## 2018-10-15 NOTE — ED PROVIDER NOTES
ED Provider Note    CHIEF COMPLAINT  Chief Complaint   Patient presents with   • Migraine   • Numbness       HPI  Gasper Bates is a 38 y.o. female who presents migraine headache and numbness to her left hand.  States that she has a history of chronic migraines.  About 10 AM she got her typical aura with black dots in in her vision.  She states about 3 hours later about 1 PM she knows she had some numbness and tingling in her left hand.  This was only involving her left and and and distal forearm on the left side.  She did not notice any weakness in the hand.  She did not notice any difficulty speaking, facial droop, numbness in the face, weakness in the leg, numbness or tingling in the leg.  She states this lasted about 20 minutes and then went away.  Continues to have a slight headache.  Patient denies any previous history of stroke.  She denies any fever, chills, neck stiffness, thunderclap headache, chest pain, shortness of breath or abdominal pain.    REVIEW OF SYSTEMS  Pertinent positives include headache, left hand numbness. Pertinent negatives include difficulty speaking, facial droop, numbness or weakness in the lower extremity. All other systems negative.    PAST MEDICAL HISTORY   has a past medical history of Anemia; Benign gestational thrombocytopenia in third trimester (HCC) (7/6/2016); Disorder of thyroid; Head ache; and Migraine.    SOCIAL HISTORY  Social History     Social History Main Topics   • Smoking status: Never Smoker   • Smokeless tobacco: Never Used   • Alcohol use No   • Drug use: No   • Sexual activity: Yes     Partners: Male       SURGICAL HISTORY   has a past surgical history that includes hysteroscopy with video diagnostic (11/5/2015) and primary c section (7/2/2016).    CURRENT MEDICATIONS  Home Medications     Reviewed by Torres Nolasco (Pharmacy Tech) on 10/15/18 at 1623  Med List Status: Complete   Medication Last Dose Status   Cholecalciferol (VITAMIN D3) 5000  "units Cap 10/14/2018 Active   Cyanocobalamin (VITAMIN B12 PO) 10/14/2018 Active   levothyroxine (SYNTHROID) 50 MCG Tab 10/15/2018 Active   MAGNESIUM PO 10/14/2018 Active   Omega-3 Fatty Acids (FISH OIL) 1000 MG Cap capsule 10/14/2018 Active   pyridoxine (VITAMIN B-6) 50 MG Tab 10/14/2018 Active                ALLERGIES  Allergies   Allergen Reactions   • Nkda [No Known Drug Allergy]        PHYSICAL EXAM  VITAL SIGNS: /78   Pulse 63   Temp 36.4 °C (97.5 °F)   Resp 18   Ht 1.549 m (5' 1\")   Wt 62.3 kg (137 lb 5.6 oz)   LMP 09/21/2018   SpO2 99%   Breastfeeding? Unknown   BMI 25.95 kg/m²   Constitutional: Well developed, Well nourished, mild distress.   HENT: Normocephalic, Atraumatic, Oropharynx moist, No oral exudates.   Eyes: Conjunctiva normal, No discharge.  3 mm and equal  Neck: Supple, No stridor, no meningismus  Cardiovascular: Normal heart rate, Normal rhythm, No murmurs, equal pulses.   Pulmonary: Normal breath sounds, No respiratory distress, No wheezing, No rales, No rhonchi.  Chest: No chest wall tenderness or deformity.   Abdomen:Soft, No tenderness,.   Back: No CVA tenderness.   Musculoskeletal: No major deformities noted, No tenderness.  5 out of 5 strength bilaterally in upper and lower extremities.  Patient reports normal sensation to gross touch bilaterally now in upper and lower extremities.  Skin: Warm, Dry, No erythema, No rash.   Neurologic: Alert & oriented x 3, Normal motor function,  No focal deficits noted. Normal finger to nose, Normal cranial nerves II-XII, No pronator drift. Equal strength in upper and lower extremities bilaterally.,  Normal heel shin  Psychiatric: Affect normal, Judgment normal, Mood normal.     EKG  Sinus rhythm, rate of 62, normal axis, no ST elevation, no T wave inversions, interpretation normal EKG    RADIOLOGY/PROCEDURES  CT-CTA NECK WITH & W/O-POST PROCESSING   Final Result      CT angiogram of the neck within normal limits.      CT-CTA HEAD WITH & " W/O-POST PROCESS   Final Result      CT angiogram of the [place] within normal limits.          Laboratory tests  Results for orders placed or performed during the hospital encounter of 10/15/18   HCG Qual Serum   Result Value Ref Range    Beta-Hcg Qualitative Serum Negative Negative   CBC WITH DIFFERENTIAL   Result Value Ref Range    WBC 7.7 4.8 - 10.8 K/uL    RBC 3.82 (L) 4.20 - 5.40 M/uL    Hemoglobin 12.2 12.0 - 16.0 g/dL    Hematocrit 36.5 (L) 37.0 - 47.0 %    MCV 95.5 81.4 - 97.8 fL    MCH 31.9 27.0 - 33.0 pg    MCHC 33.4 (L) 33.6 - 35.0 g/dL    RDW 43.0 35.9 - 50.0 fL    Platelet Count 168 164 - 446 K/uL    MPV 8.4 (L) 9.0 - 12.9 fL    Neutrophils-Polys 71.60 44.00 - 72.00 %    Lymphocytes 20.20 (L) 22.00 - 41.00 %    Monocytes 5.50 0.00 - 13.40 %    Eosinophils 1.80 0.00 - 6.90 %    Basophils 0.80 0.00 - 1.80 %    Immature Granulocytes 0.10 0.00 - 0.90 %    Nucleated RBC 0.00 /100 WBC    Neutrophils (Absolute) 5.51 2.00 - 7.15 K/uL    Lymphs (Absolute) 1.55 1.00 - 4.80 K/uL    Monos (Absolute) 0.42 0.00 - 0.85 K/uL    Eos (Absolute) 0.14 0.00 - 0.51 K/uL    Baso (Absolute) 0.06 0.00 - 0.12 K/uL    Immature Granulocytes (abs) 0.01 0.00 - 0.11 K/uL    NRBC (Absolute) 0.00 K/uL   BASIC METABOLIC PANEL   Result Value Ref Range    Sodium 136 135 - 145 mmol/L    Potassium 3.5 (L) 3.6 - 5.5 mmol/L    Chloride 106 96 - 112 mmol/L    Co2 25 20 - 33 mmol/L    Glucose 91 65 - 99 mg/dL    Bun 14 8 - 22 mg/dL    Creatinine 0.89 0.50 - 1.40 mg/dL    Calcium 8.9 8.4 - 10.2 mg/dL    Anion Gap 5.0 0.0 - 11.9   ESTIMATED GFR   Result Value Ref Range    GFR If African American >60 >60 mL/min/1.73 m 2    GFR If Non African American >60 >60 mL/min/1.73 m 2   EKG (NOW)   Result Value Ref Range    Report       [facility] Emergency Dept.    Test Date:  2018-10-15  Pt Name:    BAO RESENDIZPENIC               Department: [facility]  MRN:        [id number]                      Room:       -ROOM 7  Gender:      Female                       Technician: SAMUEL  :        [id number]                   Requested By:YOLI PATEL  Order #:    [id number]                    Reading MD:    Measurements  Intervals                                Axis  Rate:       62                           P:          50  WI:         150                          QRS:        47  QRSD:       103                          T:          52  QT:         426  QTc:        433    Interpretive Statements  Sinus rhythm  Low voltage, precordial leads  Probable anteroseptal infarct, old  No previous ECG available for comparison           Medications given in the ER  Medications   NS infusion 1,000 mL (0 mL Intravenous Stopped 10/15/18 1645)   ketorolac (TORADOL) injection 30 mg (30 mg Intravenous Given 10/15/18 1651)   ondansetron (ZOFRAN) syringe/vial injection 4 mg (4 mg Intravenous Given 10/15/18 1651)   metoclopramide (REGLAN) injection 10 mg (10 mg Intravenous Given 10/15/18 1651)   iohexol (OMNIPAQUE) 350 mg/mL (100 mL Intravenous Given 10/15/18 1732)       COURSE & MEDICAL DECISION MAKING  Pertinent Labs & Imaging studies reviewed. (See chart for details)    Differential includes stroke, complex migraine, paresthesia, electrolyte abnormality  HYDRATION: Based on the patient's presentation of Other Renal protection, for CT with IV contrast, also given for migraine cocktail the patient was given IV fluids. IV Hydration was used becasue oral hydration was not adequate alone. Upon recheck following hydration, the patient was Feeling much better without any headache.    Patient reexamined about 4:40 PM.  She no longer has a headache she does not have any neurologic deficits.    I called and discussed the case with neurology Dr. Peres at this point time I felt this was likely most likely a complex migraine but given the fact the patient never had a complex migraine I wanted his input.  He requested that a CT and CTA be done including venous imaging.  He  stated that this was negative the patient can be prescribed 81 mg aspirin and follow-up with Dr. Negrete.    I called Dr. Negrete's office and arrange for the patient to have an appointment as soon as possible.  They will place the patient on a cancellation list.  Patient's scheduled for a follow-up appointment on February 2 at 8:40 AM.    Medical decision making: At this point time patient has no neurologic deficits.  Her numbness and tingling is gone away.  It was associated only within the left hand even if this was just a TIA or would have been a small lacunar infarct.  I think this is more likely just a complex migraine given the fact the patient had her migraine symptoms followed by this numbness.  There is no neurologic deficit to the hand and her symptoms have completely resolved she does not show any signs of an arrhythmia on her EKG.  I have no suspicion for an embolic event.  Therefore since she is asymptomatic I think the patient can go home and follow-up with her primary care and her neurologist.     The patient will return for new or worsening symptoms and is stable at the time of discharge.    The patient is referred to a primary physician for blood pressure management, diabetic screening, and for all other preventative health concerns.      DISPOSITION:  Patient will be discharged home in stable condition.    FOLLOW UP:  Violetta Yeboah D.O.  [address]  [facility] [address]  [phone]    Schedule an appointment as soon as possible for a visit in 3 days      Braulio Da Silva M.D.  [address] [id number] [phone]    Schedule an appointment as soon as possible for a visit   They have an appointment for you February 22nd, at 8:40 am. See if they can get you in sooner.      OUTPATIENT MEDICATIONS:  Discharge Medication List as of 10/15/2018  6:32 PM      START taking these medications    Details   aspirin (ASA) 81 MG Chew Tab chewable tablet Take 1 Tab by mouth every  day., Disp-100 Tab, R-0, Print Rx Paper                   FINAL IMPRESSION  1. Numbness    2. Persistent migraine aura without cerebral infarction and without status migrainosus, not intractable               Electronically signed by: Gerald Merlos, 10/15/2018 3:15 PM  This record was made with a voice recognition software. The software is not perfect. I have tried to correct any grammar, spelling or context errors to the best of my ability, but errors may still remain. Interpretation of this chart should be taken in this context.

## 2018-10-16 NOTE — CONSULTS
"Typical migraine with aura visual 1000, 2hrs later 1300 30min left hand numb. Denies OCP had hysterectomy. No other complaints. Migraine cocktail in ED now symptom free- see MAR.     Exam nonfocal back to baseline, non meningitic     Durable Medical Equipment-Specific Vitals  Vitals  Blood Pressure: 117/78  Temperature: 36.7 °C (98.1 °F)  Pulse: 70  Respiration: 16  Pulse Oximetry: 100 %  Height: 154.9 cm (5' 1\")  Weight: 62.3 kg (137 lb 5.6 oz)  DME  O2 Delivery: None (Room Air)    MRB 2017 WNL- visualized    MIGRAINE WITH AURA AND STROKE LIKE SYMPTOMS LASTING MINUTES, FULLY RESOLVED    DC triptan  ASA 81MG   MAG,VITD,RIBOFLAVIN  2970 APTMT WITH SONA HANNAH ASAP  CTA/CTV HEAD  CTA NECK    No further neuro workup as inpatient if aforementioned studies WNL & maintains neuro baseline.    DISCUSSED WITH AMANDA AGREES WITH ABOVE    "

## 2018-10-16 NOTE — ED NOTES
Pt given written and oral discharge instructions. Pt verbalized understanding of all instructions given. All questions answered. VSS. IV removed. Pt given paper prescription and educated on use. Pt given f/u instructions and educated on s/s of when to return to the ER. Pt ambulating independently upon time of discharge in good condition.

## 2018-10-16 NOTE — DISCHARGE INSTRUCTIONS
Please advise   Return the emergency department if you have new or different headache, vision changes, facial numbness, facial droop, weakness, numbness, or fever

## 2018-10-16 NOTE — ED NOTES
Pt c/o second middle toe numbness. ERP aware. Pt given warm socks and now feels sensation again to left big toe. Pt given fresh warm blankets.

## 2018-10-23 ENCOUNTER — HOSPITAL ENCOUNTER (OUTPATIENT)
Facility: MEDICAL CENTER | Age: 38
End: 2018-10-23
Payer: COMMERCIAL

## 2018-10-23 LAB
BDY FAT % MEASURED: 30.5 %
BP DIAS: 60 MMHG
BP SYS: 92 MMHG
DIABETES HTDIA: NO
EVENT NAME HTEVT: NORMAL
HYPERTENSION HTHYP: NO
SCREENING LOC CITY HTCIT: NORMAL
SCREENING LOC STATE HTSTA: NORMAL
SCREENING LOCATION HTLOC: NORMAL
SUBSCRIBER ID HTSID: NORMAL

## 2018-10-24 LAB
CHOLEST SERPL-MCNC: 183 MG/DL (ref 100–199)
FASTING STATUS PATIENT QL REPORTED: NORMAL
GLUCOSE SERPL-MCNC: 93 MG/DL (ref 65–99)
HDLC SERPL-MCNC: 69 MG/DL
LDLC SERPL CALC-MCNC: 100 MG/DL
TRIGL SERPL-MCNC: 72 MG/DL (ref 0–149)

## 2018-11-27 ENCOUNTER — HOSPITAL ENCOUNTER (OUTPATIENT)
Dept: LAB | Facility: MEDICAL CENTER | Age: 38
End: 2018-11-27
Attending: NURSE PRACTITIONER
Payer: COMMERCIAL

## 2018-11-27 ENCOUNTER — OFFICE VISIT (OUTPATIENT)
Dept: MEDICAL GROUP | Facility: PHYSICIAN GROUP | Age: 38
End: 2018-11-27
Payer: COMMERCIAL

## 2018-11-27 VITALS
BODY MASS INDEX: 23.19 KG/M2 | TEMPERATURE: 97.8 F | DIASTOLIC BLOOD PRESSURE: 62 MMHG | SYSTOLIC BLOOD PRESSURE: 96 MMHG | WEIGHT: 126 LBS | HEIGHT: 62 IN | HEART RATE: 82 BPM | OXYGEN SATURATION: 99 %

## 2018-11-27 DIAGNOSIS — E03.9 ACQUIRED HYPOTHYROIDISM: ICD-10-CM

## 2018-11-27 DIAGNOSIS — E55.9 VITAMIN D INSUFFICIENCY: ICD-10-CM

## 2018-11-27 DIAGNOSIS — D50.8 IRON DEFICIENCY ANEMIA SECONDARY TO INADEQUATE DIETARY IRON INTAKE: ICD-10-CM

## 2018-11-27 DIAGNOSIS — G43.109 MIGRAINE WITH AURA AND WITHOUT STATUS MIGRAINOSUS, NOT INTRACTABLE: ICD-10-CM

## 2018-11-27 DIAGNOSIS — Z23 NEED FOR VACCINATION: ICD-10-CM

## 2018-11-27 PROCEDURE — 90471 IMMUNIZATION ADMIN: CPT | Performed by: NURSE PRACTITIONER

## 2018-11-27 PROCEDURE — 82306 VITAMIN D 25 HYDROXY: CPT

## 2018-11-27 PROCEDURE — 84443 ASSAY THYROID STIM HORMONE: CPT

## 2018-11-27 PROCEDURE — 99213 OFFICE O/P EST LOW 20 MIN: CPT | Mod: 25 | Performed by: NURSE PRACTITIONER

## 2018-11-27 PROCEDURE — 90715 TDAP VACCINE 7 YRS/> IM: CPT | Performed by: NURSE PRACTITIONER

## 2018-11-27 PROCEDURE — 36415 COLL VENOUS BLD VENIPUNCTURE: CPT

## 2018-11-27 RX ORDER — ELETRIPTAN HYDROBROMIDE 40 MG/1
40 TABLET, FILM COATED ORAL
COMMUNITY
End: 2019-02-22

## 2018-11-27 RX ORDER — ELETRIPTAN HYDROBROMIDE 20 MG/1
20 TABLET, FILM COATED ORAL
COMMUNITY
End: 2018-11-27

## 2018-11-27 NOTE — PROGRESS NOTES
CC:   Chief Complaint   Patient presents with   • Establish Care   • Thyroid Problem     refill on levothyroxine 50mcg        HISTORY OF THE PRESENT ILLNESS: Patient is a 38 y.o. female. This pleasant patient is here today to establish care and discuss multiple issues as listed below.    Health Maintenance: Tdap updated today.  Patient reports that she has had abnormal Pap smears in the past and follows with gynecology for this issue, she is having Pap smears every 6 months, her next one is scheduled for January 2019.    Vitamin D insufficiency  The patient's last vitamin D level was 19 on 12/29/2016.  The patient has since been taking over-the-counter vitamin D supplements 2000 units/day.    Acquired hypothyroidism  This is a chronic problem for patient that is controlled.  The patient is taking levothyroxine 50 mcg/day. Her last TSH was checked on 12/29/2016 and was 1.160.  The patient reports that she was diagnosed with hypothyroid in 2007 and was started on levothyroxine 50 mcg/day at that time.  In 8903-4094, she thought that she would be able to go off of her levothyroxine and manage her hypothyroid with diet, exercise, supplements.  At that time she had a high TSH in 2012 of 4.130, after which she restarted her levothyroxine.    Iron deficiency anemia secondary to inadequate dietary iron intake  This is a chronic problem for the patient that is controlled.  Patient was told that she has iron deficiency anemia and was on iron supplements which she could not tolerate due to constipation.    Migraine with aura and without status migrainosus, not intractable  This is a chronic problem for the patient that is uncontrolled.  The patient reports that she will have one bad migraine per month.  She states that it is not related to her cycle.  She thinks that dehydration and stress may be triggers.  She has not kept a migraine tracker to try to narrow down triggers.  She does have a prescription for Relpax that she has  used twice but does not think that they work.  She was seen in the ER on October 15, 2018 with migraine symptoms and possible strokelike symptoms.  She had a CT scan and a CTA that were negative.  She was discharged and advised to take aspirin 81 mg daily, follow-up with PCP to get an MRI order, and follow-up with Dr. Negrete from neuro who she had previously seen for migraines.  She has a follow-up appointment with Dr. Negrete on 2/22/2018.  She is not taking the aspirin 81 mg/day and has not had the MRI related to cost.  She did follow-up with her prior PCP who ordered an echo, which she has not completed either related to cost.  She will follow-up with Dr. Negrete for further recommendations.  She does not need a refill of Relpax at this time.    Allergies: Nkda [no known drug allergy]    Current Outpatient Prescriptions Ordered in [facility]   Medication Sig Dispense Refill   • vitamin D (CHOLECALCIFEROL) 1000 UNIT Tab Take 5,000 Units by mouth every day.     • eletriptan (RELPAX) 40 MG tablet Take 40 mg by mouth Once PRN (migraine).     • MAGNESIUM PO Take 1 Tab by mouth every day.     • Cyanocobalamin (VITAMIN B12 PO) Take 1 Tab by mouth every day.     • Omega-3 Fatty Acids (FISH OIL) 1000 MG Cap capsule Take 1,000 mg by mouth every day.     • levothyroxine (SYNTHROID) 50 MCG Tab Take 1 Tab by mouth every day. 90 Tab 3     No current Epic-ordered facility-administered medications on file.        Past Medical History:   Diagnosis Date   • Abnormal Pap smear of cervix    • Anemia    • Benign gestational thrombocytopenia in third trimester (HCC) 7/6/2016   • Disorder of thyroid    • Migraine    • Vitamin D insufficiency        Past Surgical History:   Procedure Laterality Date   • PRIMARY C SECTION  7/2/2016    Procedure: PRIMARY C SECTION;  Surgeon: Zoë Leiva M.D.;  Location: LABOR AND DELIVERY;  Service:    • HYSTEROSCOPY WITH VIDEO DIAGNOSTIC  11/5/2015    Procedure: HYSTEROSCOPY WITH VIDEO DIAGNOSTIC with  polypectomy;  Surgeon: Arpit Gorman M.D.;  Location: SURGERY [facility];  Service:    • COLPOSCOPY         Social History   Substance Use Topics   • Smoking status: Never Smoker   • Smokeless tobacco: Never Used   • Alcohol use 1.2 oz/week     2 Standard drinks or equivalent per week      Comment: occ       Social History     Social History Narrative   • No narrative on file       Family History   Problem Relation Age of Onset   • No Known Problems Mother    • Alcohol/Drug Father    • Diabetes Sister    • Hyperlipidemia Sister    • No Known Problems Maternal Aunt    • No Known Problems Maternal Uncle    • No Known Problems Maternal Grandmother    • No Known Problems Maternal Grandfather    • No Known Problems Paternal Grandmother    • Kidney Disease Paternal Grandfather    • Diabetes Paternal Grandfather        ROS:     - Constitutional:  Negative for fever, chills, unexpected weight change, night sweats, body aches, sleep issues,  and fatigue/generalized weakness.     - HEENT:  Negative for headaches, vision changes, hearing changes, ear pain, tinnitus, ear discharge, rhinorrhea, sinus congestion, sneezing, sore throat, and neck pain.      - Respiratory:  Negative for cough, shortness of breath, sputum production, hemoptysis, chest congestion, dyspnea, wheezing, and crackles.      - Cardiovascular:  Negative for chest pain, palpitations, BAUTISTA, paroxsymal nocturnal dyspnea, orthopnea, and bilateral lower extremity edema.     - Gastrointestinal:  Negative for heartburn, nausea, vomiting, abdominal pain, hematochezia, melena, diarrhea, constipation, and greasy/foul-smelling stools.     - Genitourinary:  Negative for dysuria, nocturia, polyuria, hematuria, pyuria, urinary urgency, urinary frequency, and urinary incontinence.     - Musculoskeletal:  Negative for myalgias, back pain, and joint pain.     - Skin:  Negative for rash, sores, lumps, itching, cyanotic skin color change.     - Neurological:  Negative  "for dizziness, tingling, tremors, focal sensory deficit, focal weakness and headaches.     - Endo/Heme/Allergies:  Does not bruise/bleed easily. Denies cold/heat intolerance.     - Psychiatric/Behavioral: Negative for depression, suicidal/homicidal ideation and memory loss.        Exam: Blood pressure (!) 96/62, pulse 82, temperature 36.6 °C (97.8 °F), temperature source Temporal, height 1.575 m (5' 2\"), weight 57.2 kg (126 lb), last menstrual period 10/30/2018, SpO2 99 %, not currently breastfeeding. Body mass index is 23.05 kg/m².    General:  Normal appearing. No distress.  HEENT:  Normocephalic. Eyes conjunctiva clear lids without ptosis, pupils equal and reactive to light accommodation, ears normal shape and contour.   Pulmonary:  Clear to ausculation.  Normal effort. No rales, ronchi, or wheezing.  Cardiovascular:  Regular rate and rhythm without murmur. Carotid and radial pulses are intact and equal bilaterally.  Neurologic:  Grossly nonfocal  Skin:  Warm and dry.  No obvious lesions.  Musculoskeletal:  Normal gait. No extremity cyanosis, clubbing, or edema.  Psych:  Normal mood and affect. Alert and oriented x3. Judgment and insight is normal.    Assessment/Plan  1. Acquired hypothyroidism  This is a chronic problem for the patient that is controlled with levothyroxine 50 mcg/day.  The patient has not had a TSH level checked since 2016.  We will plan to recheck a TSH with reflex to free T4 today and I will notify the patient via my chart with results.  After receiving the results I will send in a refill for her levothyroxine.  - TSH WITH REFLEX TO FT4; Future    2. Migraine with aura and without status migrainosus, not intractable  This is a chronic problem for the patient that is uncontrolled.  The patient ended up in the ER on 10/15/2018 after developing a migraine and then having unilateral weakness/numbness, and she was concerned for strokelike symptoms.  While in the ER she was rehydrated with IV " fluids, had a CT scan of her head, and a CTA, all of which were negative.  She was discharged and advised to take aspirin 81 mg/day, which she reports she is not taking.  She followed up with her prior PCP who ordered an MRI and an echocardiogram which she has not completed due to cost.  She does have a follow-up appointment with Dr. Negrete on 2/22/2019, she has seen this specialist in the past for her migraines, at that time she will discuss further workup.  She does have a prescription for Relpax to be used as needed for migraines and does not need a refill at this time.  The patient will try to keep a migraine tracker to help identify triggers, however she believes that stress and dehydration are her main triggers.    3. Iron deficiency anemia secondary to inadequate dietary iron intake  This is a chronic problem for the patient that is stable.  The patient was previously prescribed an iron supplement for this issue, however she could not tolerate the constipation as a side effect.  She recently had a CBC completed on 10/15/2018 when she was in the ER with results that were similar to her CBCs in 2016.     4. Vitamin D insufficiency  This is a chronic problem for the patient that is uncontrolled.  The patient has not had a vitamin D level checked since 2016, but she does take a over-the-counter vitamin D supplement.  We will plan to recheck a vitamin D level.  - VITAMIN D,25 HYDROXY; Future    5. Need for vaccination  Given today.  - Tdap =>6yo IM     Educated in proper administration of medication(s) ordered today including safety, possible SE, risks, benefits, rationale and alternatives to therapy.   Supportive care, differential diagnoses, and indications for immediate follow-up discussed with patient.    Pathogenesis of diagnosis discussed including typical length and natural progression.    Instructed to return to clinic or nearest emergency department for any change in condition, further concerns, or  worsening of symptoms.  Patient states understanding of the plan of care and discharge instructions.    Consent for records release signed to order medical records from Dr. Obinna ashby PCP and Dr. Burnett gynecology.    Return in about 1 year (around 11/27/2019) for Preventative Annual, Follow up Labs, Thyroid.    I have placed the below orders and discussed them with an approved delegating provider. The MA is performing the below orders under the direction of Dr. Juan.    Please note that this dictation was created using voice recognition software. I have made every reasonable attempt to correct obvious errors, but I expect that there are errors of grammar and possibly content that I did not discover before finalizing the note.

## 2018-11-27 NOTE — ASSESSMENT & PLAN NOTE
This is a chronic problem for the patient that is uncontrolled.  The patient reports that she will have one bad migraine per month.  She states that it is not related to her cycle.  She thinks that dehydration and stress may be triggers.  She has not kept a migraine tracker to try to narrow down triggers.  She does have a prescription for Relpax that she has used twice but does not think that they work.  She was seen in the ER on October 15, 2018 with migraine symptoms and possible strokelike symptoms.  She had a CT scan and a CTA that were negative.  She was discharged and advised to take aspirin 81 mg daily, follow-up with PCP to get an MRI order, and follow-up with Dr. Negrete from neuro who she had previously seen for migraines.  She has a follow-up appointment with Dr. Negrete on 2/22/2018.  She is not taking the aspirin 81 mg/day and has not had the MRI related to cost.  She did follow-up with her prior PCP who ordered an echo, which she has not completed either related to cost.  She will follow-up with Dr. Negrete for further recommendations.  She does not need a refill of Relpax at this time.

## 2018-11-27 NOTE — ASSESSMENT & PLAN NOTE
This is a chronic problem for patient that is controlled.  The patient is taking levothyroxine 50 mcg/day. Her last TSH was checked on 12/29/2016 and was 1.160.  The patient reports that she was diagnosed with hypothyroid in 2007 and was started on levothyroxine 50 mcg/day at that time.  In 5661-8162, she thought that she would be able to go off of her levothyroxine and manage her hypothyroid with diet, exercise, supplements.  At that time she had a high TSH in 2012 of 4.130, after which she restarted her levothyroxine.

## 2018-11-27 NOTE — LETTER
[facility]  MARCEL Yang  [address]  [place] [address]  Fax: [phone]   Authorization for Release/Disclosure of   Protected Health Information   Name: GASPER ESTEVES : 1980 SSN: [id number]   Address: [address] [place] [address] [place] [address] Phone:    [phone] (home)    I authorize the entity listed below to release/disclose the PHI below to:   [facility]/MARCEL Yang and MARCEL Yang   Provider or Entity Name:  Dr. Nelson   Address   City, [facility], [facility]   Phone:      Fax:     Reason for request: continuity of care   Information to be released:    [  ] LAST COLONOSCOPY,  including any PATH REPORT and follow-up  [  ] LAST FIT/COLOGUARD RESULT [  ] LAST DEXA  [  ] LAST MAMMOGRAM  [  ] LAST PAP  [  ] LAST LABS [  ] RETINA EXAM REPORT  [  ] IMMUNIZATION RECORDS  [X] Release all info      [  ] Check here and initial the line next to each item to release ALL health information INCLUDING  _____ Care and treatment for drug and / or alcohol abuse  _____ HIV testing, infection status, or AIDS  _____ Genetic Testing    DATES OF SERVICE OR TIME PERIOD TO BE DISCLOSED: _____________  I understand and acknowledge that:  * This Authorization may be revoked at any time by you in writing, except if your health information has already been used or disclosed.  * Your health information that will be used or disclosed as a result of you signing this authorization could be re-disclosed by the recipient. If this occurs, your re-disclosed health information may no longer be protected by State or Federal laws.  * You may refuse to sign this Authorization. Your refusal will not affect your ability to obtain treatment.  * This Authorization becomes effective upon signing and will  on (date) __________.      If no date is indicated, this Authorization will  one (1) year from the signature date.    Name: Gasper Esteves    Signature:   Date:          11/27/2018       PLEASE FAX REQUESTED RECORDS BACK TO: [phone]

## 2018-11-27 NOTE — LETTER
[facility]  MARCEL Yang  [address]  [place] [address]  Fax: [phone]   Authorization for Release/Disclosure of   Protected Health Information   Name: GASPER ESTEVES : 1980 SSN: [id number]   Address: [address] [place] [address]  [place] [address] Phone:    [phone] (home)    I authorize the entity listed below to release/disclose the PHI below to:   [facility]/MARCEL Yang and MARCEL Yang   Provider or Entity Name:  Dr. Burnett   Address   City, State, [facility]   Phone:      Fax:     Reason for request: continuity of care   Information to be released:    [  ] LAST COLONOSCOPY,  including any PATH REPORT and follow-up  [  ] LAST FIT/COLOGUARD RESULT [  ] LAST DEXA  [  ] LAST MAMMOGRAM  [X] LAST PAP  [  ] LAST LABS [  ] RETINA EXAM REPORT  [  ] IMMUNIZATION RECORDS  [  ] Release all info      [  ] Check here and initial the line next to each item to release ALL health information INCLUDING  _____ Care and treatment for drug and / or alcohol abuse  _____ HIV testing, infection status, or AIDS  _____ Genetic Testing    DATES OF SERVICE OR TIME PERIOD TO BE DISCLOSED: _____________  I understand and acknowledge that:  * This Authorization may be revoked at any time by you in writing, except if your health information has already been used or disclosed.  * Your health information that will be used or disclosed as a result of you signing this authorization could be re-disclosed by the recipient. If this occurs, your re-disclosed health information may no longer be protected by State or Federal laws.  * You may refuse to sign this Authorization. Your refusal will not affect your ability to obtain treatment.  * This Authorization becomes effective upon signing and will  on (date) __________.      If no date is indicated, this Authorization will  one (1) year from the signature date.    Name: Gasper Esteves    Signature:   Date:          11/27/2018       PLEASE FAX REQUESTED RECORDS BACK TO: [phone]

## 2018-11-27 NOTE — ASSESSMENT & PLAN NOTE
The patient's last vitamin D level was 19 on 12/29/2016.  The patient has since been taking over-the-counter vitamin D supplements 2000 units/day.

## 2018-11-27 NOTE — ASSESSMENT & PLAN NOTE
This is a chronic problem for the patient that is controlled.  Patient was told that she has iron deficiency anemia and was on iron supplements which she could not tolerate due to constipation.

## 2018-11-28 DIAGNOSIS — E03.9 ACQUIRED HYPOTHYROIDISM: ICD-10-CM

## 2018-11-28 DIAGNOSIS — E03.4 HYPOTHYROIDISM DUE TO ACQUIRED ATROPHY OF THYROID: ICD-10-CM

## 2018-11-28 LAB
25(OH)D3 SERPL-MCNC: 26 NG/ML (ref 30–100)
TSH SERPL DL<=0.005 MIU/L-ACNC: 1.77 UIU/ML (ref 0.38–5.33)

## 2018-11-28 RX ORDER — LEVOTHYROXINE SODIUM 0.05 MG/1
50 TABLET ORAL DAILY
Qty: 90 TAB | Refills: 3 | Status: SHIPPED | OUTPATIENT
Start: 2018-11-28 | End: 2019-12-03 | Stop reason: SDUPTHER

## 2018-11-29 NOTE — PROGRESS NOTES
1. Acquired hypothyroidism  The patient's most recent TSH on 11/27/2018 was 1.770.  Patient notified via my chart that result was within normal range and a refill for her levothyroxine 50 mcg/day was sent to her pharmacy.  - levothyroxine (SYNTHROID) 50 MCG Tab; Take 1 Tab by mouth every day.  Dispense: 90 Tab; Refill: 3

## 2019-02-22 ENCOUNTER — OFFICE VISIT (OUTPATIENT)
Dept: NEUROLOGY | Facility: MEDICAL CENTER | Age: 39
End: 2019-02-22
Payer: COMMERCIAL

## 2019-02-22 VITALS
DIASTOLIC BLOOD PRESSURE: 60 MMHG | HEART RATE: 74 BPM | WEIGHT: 133 LBS | TEMPERATURE: 97.7 F | HEIGHT: 62 IN | SYSTOLIC BLOOD PRESSURE: 94 MMHG | OXYGEN SATURATION: 99 % | BODY MASS INDEX: 24.48 KG/M2 | RESPIRATION RATE: 16 BRPM

## 2019-02-22 DIAGNOSIS — R20.2 PARESTHESIAS IN LEFT HAND: Primary | ICD-10-CM

## 2019-02-22 DIAGNOSIS — G43.109 MIGRAINE WITH AURA AND WITHOUT STATUS MIGRAINOSUS, NOT INTRACTABLE: ICD-10-CM

## 2019-02-22 PROCEDURE — 99214 OFFICE O/P EST MOD 30 MIN: CPT | Performed by: PSYCHIATRY & NEUROLOGY

## 2019-02-22 RX ORDER — SUMATRIPTAN AND NAPROXEN SODIUM 85; 500 MG/1; MG/1
TABLET, FILM COATED ORAL
Qty: 9 TAB | Refills: 2 | Status: SHIPPED | OUTPATIENT
Start: 2019-02-22 | End: 2019-03-22 | Stop reason: CLARIF

## 2019-02-22 RX ORDER — RIBOFLAVIN (VITAMIN B2) 100 MG
TABLET ORAL
COMMUNITY
End: 2020-01-13

## 2019-02-22 ASSESSMENT — PATIENT HEALTH QUESTIONNAIRE - PHQ9: CLINICAL INTERPRETATION OF PHQ2 SCORE: 0

## 2019-02-22 ASSESSMENT — ENCOUNTER SYMPTOMS
DIZZINESS: 0
NECK PAIN: 0
HEADACHES: 1
FALLS: 0
DOUBLE VISION: 0
PHOTOPHOBIA: 0
MEMORY LOSS: 0

## 2019-02-22 NOTE — PROGRESS NOTES
Subjective:      Gasper Bates is a 38 y.o. female who presents for follow-up, with a history of migraine with visual aura, but who suffered from an isolated event transient left upper extremity numbness and weakness following a migraine attack in October, 2018.     HPI    Migraines themselves have remained essentially unchanged, occurring maybe once a month.  Unfortunately Relpax rescue has provided limited benefit.  They now typically start with visual scintillations and scotoma, followed by unilateral headache that is incapacitating, pulsatile, etc.  She started magnesium and riboflavin which seemed to help to a degree, she notes that if she forgets them on a couple of days that the headache will typically follow.  She has not tried any of the other supplements.    In October, she had been in her usual state of health.  There have been no other medication regimen changes, she denied need to be deprivation, preceding head trauma, etc.  She suffered from her typical migraine, took Relpax, the headache had begun to resolve but about 3 hours after onset, the left hand started become tingling and numb, starting the fingers, and descending all the way up the arm into the shoulder.  At their worst the hand felt numb to touch, there was clumsiness with the hand because of the loss of sensation.  She denied neck pain with distal radiation, the face or leg ipsilaterally never got involved.  The headache did not worsen, there were no visual distortions, but she did describe some cognitive impairment and slowing briefly.  The whole episode resolved within about 20-30 minutes, it did not recur.  Because of this she came to the emergency room.    I reviewed the electronic health record, CT of the brain, CTA of the brain and neck were all done, all proving unremarkable.  Neurologic consultation was obtained via phone, it was felt this might of been a complicated migraine, no specific therapeutic interventions were  "offered as she had become asymptomatic prior to arrival.  Lab work included unremarkable lipid profile and BMP.  Hemoglobin A1c also was normal.  Hemogram showed WBC 7.7, H/H 12.2/36.5 and platelets 168K.  After discharge, vitamin D level was checked to be low at 26, TSH normal at 1.77.    Medical, surgical and family histories are reviewed and elect, there are no new drug allergies.  From my standpoint she is on Relpax 40 mg as needed, she takes magnesium 500 mg, twice daily, riboflavin 400 mg daily, Synthroid, fish oil, vitamin D and calcium and B12 supplements daily.    Review of Systems   Constitutional: Negative for malaise/fatigue.   Eyes: Negative for double vision and photophobia.   Musculoskeletal: Negative for falls and neck pain.   Neurological: Positive for headaches. Negative for dizziness.   Psychiatric/Behavioral: Negative for memory loss.   All other systems reviewed and are negative.       Objective:     BP (!) 94/60 (BP Location: Right arm, Patient Position: Sitting, BP Cuff Size: Adult)   Pulse 74   Temp 36.5 °C (97.7 °F) (Temporal)   Resp 16   Ht 1.575 m (5' 2\")   Wt 60.3 kg (133 lb)   SpO2 99%   BMI 24.33 kg/m²      Physical Exam    She appears in no acute distress.  Vital signs are stable.  There is no malar rash, jaw or temporal tenderness, jaw claudication, or allodynia.  The neck is supple, range of motion is full, there is no tenderness of the spinous processes or cervical paraspinal muscle bodies on either side.  Compression maneuvers are negative, Lhermitte's phenomena is absent.  Cardiac evaluation reveals a regular rhythm.  Carotid pulses are present bilaterally without asymmetry on palpation.  There is no tenderness of the elbows or wrists on the left side.  Distal pulses intact.    Mentation is intact, fully oriented, there is no evidence of focal cognitive or language deficit.    PERRLA/EOMI, visual fields are full to finger counting bilaterally on confrontation, facial " movements are symmetric, there is no bulbar dysfunction with the tongue and uvula midline, sensory exam is intact to temperature, pinprick and light touch bilaterally, shoulder shrug and head rotation are intact and symmetric.    Musculoskeletal exam reveals normal tone without tremor, asterixis, or drift.  Strength is 5/5 throughout, with attention paid to the hand intrinsic musculatures bilaterally.  Reflexes are brisk and symmetric, none are dropped, both toes are downgoing.    She stands easily, gait is of normal station, arm swing is symmetric.  Repetitive movements with the hands, fingers and feet are intact and symmetric in all 4 extremities with normal amplitude and frequencies.  There is no appendicular dystaxia.    Sensory exam is exquisitely intact to temperature, pinprick and vibration in the upper extremities, vibration is intact in the lower extremities.     Assessment/Plan:     1. Paresthesias in left hand  An unusual, additional symptom, I am not convinced that this is another symptom related to her migraine headaches, especially given the fact that the migraine itself was resolving before this started, the headache did not increase, she has never had symptoms like this with her migraines in the past.  A monoextremity impairment suggests a peripheral process such as a radiculopathy, the problem is that the entire extremity seemed to be involved.  Possibility of seizure versus ischemic insult does need to be entertained though the latter less likely simply because of lack of risk other than her history of migraine with aura.  Focal nerve compression at the wrist or elbow is less likely though could be considered.  Depending on symptom evolution, EMG/NCV studies might be necessary.    The nature of all of the above was reviewed.  Though I doubt that this is MS, tumor, etc., since seizures are a possibility, imaging of the brain does need to be done as does an EEG.  She will keep track of these events.   If her brain imaging study is unremarkable, and especially if she continues to have these events, especially if independent of the headaches, then MRI imaging of the neck would be the next step.  We will keep contact with her in regards to the events, we will call her if test results are abnormal and would warrant changes in her diagnostic regimen, otherwise we talk specifics at her next office visit.  Some blood work will be checked for autoimmune diseases.  Her vitamin D level is low, this can be considered a stroke risk, though she is on vitamin D supplement.  This level will be rechecked.    - MR-BRAIN-W/O; Future  - REFERRAL TO NEURODIAGNOSTICS (EEG,EP,EMG/NCS/DBS) Modality Requested: EEG-Video  - WESTERGREN SED RATE; Future  - VITAMIN D,1,25-DIHYDROXY  - CRP HIGH SENSITIVE    2. Migraine with aura and without status migrainosus, not intractable  For now we will continue the rescue medication only, she will stay on the magnesium and riboflavin supplements.  Treximet will be substituted for the Relpax which has lost efficacy, taken in similar fashion with first pain onset and then repeated in 1 hour or longer if needed.  Side effects were reviewed.    - Sumatriptan-Naproxen Sodium (TREXIMET)  MG Tab; 1 tab at headache onset; repeat in 1 hour prn  Dispense: 9 Tab; Refill: 2    Time: 25 minutes spent face-to-face for exam, review, discussion, and education, of this over 50% of the time spent counseling and coordinating care surrounding all of the above issues.

## 2019-03-14 ENCOUNTER — OFFICE VISIT (OUTPATIENT)
Dept: URGENT CARE | Facility: MEDICAL CENTER | Age: 39
End: 2019-03-14
Payer: COMMERCIAL

## 2019-03-14 VITALS
BODY MASS INDEX: 24 KG/M2 | TEMPERATURE: 98.2 F | HEIGHT: 62 IN | HEART RATE: 80 BPM | SYSTOLIC BLOOD PRESSURE: 96 MMHG | WEIGHT: 130.4 LBS | OXYGEN SATURATION: 99 % | DIASTOLIC BLOOD PRESSURE: 68 MMHG

## 2019-03-14 DIAGNOSIS — R68.89 FLU-LIKE SYMPTOMS: ICD-10-CM

## 2019-03-14 PROCEDURE — 99214 OFFICE O/P EST MOD 30 MIN: CPT | Performed by: FAMILY MEDICINE

## 2019-03-14 RX ORDER — OSELTAMIVIR PHOSPHATE 75 MG/1
75 CAPSULE ORAL EVERY 12 HOURS
Qty: 10 CAP | Refills: 0 | Status: SHIPPED | OUTPATIENT
Start: 2019-03-14 | End: 2019-03-19

## 2019-03-14 ASSESSMENT — ENCOUNTER SYMPTOMS
SHORTNESS OF BREATH: 0
DIZZINESS: 0
HEMOPTYSIS: 0
ORTHOPNEA: 0
FEVER: 0
CHILLS: 0
FOCAL WEAKNESS: 0

## 2019-03-14 NOTE — PROGRESS NOTES
Subjective:      Gasper Bates is a 38 y.o. female who presents with Fever (x2 days; Nausea; headache; body aches; lethargic )      - This is a very pleasant, well and non-toxic appearing 38 y.o. female with complaints of 2 days w/ sudden onset aches/malaise, subjective fever, nausea, headache, some stuffy nose.           ALLERGIES:  Nkda [no known drug allergy]     PMH:  Past Medical History:   Diagnosis Date   • Abnormal Pap smear of cervix    • Anemia    • Benign gestational thrombocytopenia in third trimester (HCC) 7/6/2016   • Disorder of thyroid    • Migraine    • Vitamin D insufficiency         PSH:  Past Surgical History:   Procedure Laterality Date   • PRIMARY C SECTION  7/2/2016    Procedure: PRIMARY C SECTION;  Surgeon: Zoë Leiva M.D.;  Location: LABOR AND DELIVERY;  Service:    • HYSTEROSCOPY WITH VIDEO DIAGNOSTIC  11/5/2015    Procedure: HYSTEROSCOPY WITH VIDEO DIAGNOSTIC with polypectomy;  Surgeon: Arpit Gorman M.D.;  Location: SURGERY [facility];  Service:    • COLPOSCOPY         MEDS:    Current Outpatient Prescriptions:   •  oseltamivir (TAMIFLU) 75 MG Cap, Take 1 Cap by mouth every 12 hours for 5 days., Disp: 10 Cap, Rfl: 0  •  Riboflavin (VITAMIN B-2) 100 MG Tab, Take  by mouth., Disp: , Rfl:   •  levothyroxine (SYNTHROID) 50 MCG Tab, Take 1 Tab by mouth every day., Disp: 90 Tab, Rfl: 3  •  vitamin D (CHOLECALCIFEROL) 1000 UNIT Tab, Take 5,000 Units by mouth every day., Disp: , Rfl:   •  MAGNESIUM PO, Take 1 Tab by mouth every day., Disp: , Rfl:   •  Cyanocobalamin (VITAMIN B12 PO), Take 1 Tab by mouth every day., Disp: , Rfl:   •  Omega-3 Fatty Acids (FISH OIL) 1000 MG Cap capsule, Take 1,000 mg by mouth every day., Disp: , Rfl:   •  Sumatriptan-Naproxen Sodium (TREXIMET)  MG Tab, 1 tab at headache onset; repeat in 1 hour prn (Patient not taking: Reported on 3/14/2019), Disp: 9 Tab, Rfl: 2    ** I have documented what I find to be significant in regards to past  "medical, social, family and surgical history  in my HPI or under PMH/PSH/FH review section, otherwise it is contributory **           HPI    Review of Systems   Constitutional: Negative for chills and fever.   Respiratory: Negative for hemoptysis and shortness of breath.    Cardiovascular: Negative for chest pain and orthopnea.   Neurological: Negative for dizziness and focal weakness.          Objective:     BP (!) 96/68   Pulse 80   Temp 36.8 °C (98.2 °F) (Temporal)   Ht 1.575 m (5' 2\")   Wt 59.1 kg (130 lb 6.4 oz)   SpO2 99%   BMI 23.85 kg/m²      Physical Exam   Constitutional: She appears well-developed. No distress.   HENT:   Head: Normocephalic and atraumatic.   Mouth/Throat: Oropharynx is clear and moist.   Eyes: Conjunctivae are normal.   Neck: Neck supple.   Cardiovascular: Regular rhythm.    No murmur heard.  Pulmonary/Chest: Effort normal. No respiratory distress.   Neurological: She is alert. She exhibits normal muscle tone.   Skin: Skin is warm and dry.   Psychiatric: She has a normal mood and affect. Judgment normal.   Nursing note and vitals reviewed.              Assessment/Plan:         1. Flu-like symptoms  oseltamivir (TAMIFLU) 75 MG Cap             Dx & d/c instructions discussed w/ patient and/or family members.     ER precautions (worsening signs symptoms and when to go to ER) discussed.    Follow up w/ PCP in 2-3 days to make sure symptoms improving and no further intervention/treatment and/or work-up needed was advised, ER if feeling worse or not improving in 2 days.    Possible side effects (i.e. Rash, GI upset/constipation, sedation, elevation of BP or sugars) of any medications given discussed.     Patient left in stable condition              "

## 2019-03-14 NOTE — LETTER
March 14, 2019         Patient: Gasper Bates   YOB: 1980   Date of Visit: 3/14/2019           To Whom it May Concern:    Gasper Bates was seen in my clinic on 3/14/2019. She may return to work in 2-4 days.    If you have any questions or concerns, please don't hesitate to call.        Sincerely,           Paulo Tolbert M.D.  Electronically Signed

## 2019-03-22 ENCOUNTER — TELEPHONE (OUTPATIENT)
Dept: NEUROLOGY | Facility: MEDICAL CENTER | Age: 39
End: 2019-03-22

## 2019-03-22 DIAGNOSIS — G43.109 MIGRAINE WITH AURA AND WITHOUT STATUS MIGRAINOSUS, NOT INTRACTABLE: ICD-10-CM

## 2019-03-22 RX ORDER — SUMATRIPTAN 100 MG/1
TABLET, FILM COATED ORAL
Qty: 9 TAB | Refills: 6 | Status: SHIPPED | OUTPATIENT
Start: 2019-03-22 | End: 2021-10-25

## 2019-03-22 RX ORDER — NAPROXEN SODIUM 550 MG/1
TABLET ORAL
Qty: 45 TAB | Refills: 1 | Status: SHIPPED | OUTPATIENT
Start: 2019-03-22 | End: 2020-01-13

## 2019-03-22 NOTE — TELEPHONE ENCOUNTER
Treximet was denied. Per pt insurance, she must try sumatriptan and naproxen separately first. Can you please prescribe?

## 2019-03-22 NOTE — TELEPHONE ENCOUNTER
Done.  Please inform the patient of the change and instructions which is essentially 1 tablet each of Anaprox and Imitrex at headache onset, repeating that dose in 1 hour if needed.

## 2019-04-01 ENCOUNTER — HOSPITAL ENCOUNTER (OUTPATIENT)
Dept: RADIOLOGY | Facility: MEDICAL CENTER | Age: 39
End: 2019-04-01
Attending: PSYCHIATRY & NEUROLOGY
Payer: COMMERCIAL

## 2019-04-01 DIAGNOSIS — R20.2 PARESTHESIAS IN LEFT HAND: ICD-10-CM

## 2019-04-01 PROCEDURE — 70551 MRI BRAIN STEM W/O DYE: CPT

## 2019-09-03 ENCOUNTER — HOSPITAL ENCOUNTER (OUTPATIENT)
Dept: LAB | Facility: MEDICAL CENTER | Age: 39
End: 2019-09-03
Attending: OBSTETRICS & GYNECOLOGY
Payer: COMMERCIAL

## 2019-09-03 LAB — CYTOLOGY REG CYTOL: NORMAL

## 2019-09-03 PROCEDURE — 88175 CYTOPATH C/V AUTO FLUID REDO: CPT

## 2019-10-10 ENCOUNTER — EH NON-PROVIDER (OUTPATIENT)
Dept: OCCUPATIONAL MEDICINE | Facility: CLINIC | Age: 39
End: 2019-10-10

## 2019-10-10 DIAGNOSIS — Z23 NEED FOR VACCINATION: Primary | ICD-10-CM

## 2019-10-10 PROCEDURE — 90686 IIV4 VACC NO PRSV 0.5 ML IM: CPT | Performed by: NURSE PRACTITIONER

## 2019-10-16 ENCOUNTER — EH NON-PROVIDER (OUTPATIENT)
Dept: OCCUPATIONAL MEDICINE | Facility: CLINIC | Age: 39
End: 2019-10-16

## 2019-10-16 DIAGNOSIS — Z02.89 ENCOUNTER FOR OCCUPATIONAL HEALTH EXAMINATION INVOLVING RESPIRATOR: ICD-10-CM

## 2019-10-16 PROCEDURE — 94375 RESPIRATORY FLOW VOLUME LOOP: CPT | Performed by: NURSE PRACTITIONER

## 2019-10-17 ENCOUNTER — DOCUMENTATION (OUTPATIENT)
Dept: OCCUPATIONAL MEDICINE | Facility: CLINIC | Age: 39
End: 2019-10-17

## 2019-10-17 NOTE — PROGRESS NOTES
Respiratory questionnaire reviewed and signed. See scanned documents.    OK for mask fit event.         None known

## 2019-12-01 DIAGNOSIS — E03.4 HYPOTHYROIDISM DUE TO ACQUIRED ATROPHY OF THYROID: ICD-10-CM

## 2019-12-03 RX ORDER — LEVOTHYROXINE SODIUM 0.05 MG/1
TABLET ORAL
Qty: 90 TAB | Refills: 0 | OUTPATIENT
Start: 2019-12-03

## 2019-12-03 NOTE — TELEPHONE ENCOUNTER
Phone Number Called: [phone] (home)       Call outcome: left message informing patient of denial and need for appointment     Message:

## 2019-12-03 NOTE — TELEPHONE ENCOUNTER
Was the patient seen in the last year in this department? No  last seen 11/27/2018    Does patient have an active prescription for medications requested? No     Received Request Via: Pharmacy

## 2020-01-13 ENCOUNTER — OFFICE VISIT (OUTPATIENT)
Dept: MEDICAL GROUP | Facility: PHYSICIAN GROUP | Age: 40
End: 2020-01-13
Payer: COMMERCIAL

## 2020-01-13 VITALS
DIASTOLIC BLOOD PRESSURE: 66 MMHG | BODY MASS INDEX: 25.4 KG/M2 | HEIGHT: 62 IN | SYSTOLIC BLOOD PRESSURE: 104 MMHG | HEART RATE: 64 BPM | WEIGHT: 138 LBS | OXYGEN SATURATION: 97 % | TEMPERATURE: 97.9 F

## 2020-01-13 DIAGNOSIS — G43.109 MIGRAINE WITH AURA AND WITHOUT STATUS MIGRAINOSUS, NOT INTRACTABLE: ICD-10-CM

## 2020-01-13 DIAGNOSIS — Z00.00 ENCOUNTER FOR WELL ADULT EXAM WITHOUT ABNORMAL FINDINGS: ICD-10-CM

## 2020-01-13 DIAGNOSIS — D50.8 IRON DEFICIENCY ANEMIA SECONDARY TO INADEQUATE DIETARY IRON INTAKE: ICD-10-CM

## 2020-01-13 DIAGNOSIS — E03.9 ACQUIRED HYPOTHYROIDISM: ICD-10-CM

## 2020-01-13 DIAGNOSIS — E55.9 VITAMIN D INSUFFICIENCY: ICD-10-CM

## 2020-01-13 PROCEDURE — 99395 PREV VISIT EST AGE 18-39: CPT | Performed by: NURSE PRACTITIONER

## 2020-01-13 ASSESSMENT — PATIENT HEALTH QUESTIONNAIRE - PHQ9: CLINICAL INTERPRETATION OF PHQ2 SCORE: 0

## 2020-01-13 NOTE — ASSESSMENT & PLAN NOTE
Chronic problem for the patient that is ongoing and stable.  The patient was previously following with neurology routinely, but now only follows up as needed.  Patient reports that her last migraine was approximately 6 months ago, which is improved.  Patient reports that prescribe triptan's do not usually help during an episode, and only make her nauseous.

## 2020-01-13 NOTE — ASSESSMENT & PLAN NOTE
Chronic problem for the patient that is ongoing.  Patient reports that she was previously taking iron supplements, but stopped the supplement due to constipation.  Patient declines updating labs at this time.

## 2020-01-13 NOTE — ASSESSMENT & PLAN NOTE
Chronic, ongoing.  Denies any muscle weakness.  No history of CKD.  Reports having a good diet, including dairy products.  No history of IBD's, celiac, CF, or surgeries causing malabsorption.  Is taking vitamin d supplement, 5000 units/day.   Due for updated lab work.   12/29/2016 14:56 11/27/2018 15:13   25-Hydroxy   Vitamin D 25 19 (L) 26 (L)

## 2020-01-13 NOTE — PATIENT INSTRUCTIONS
Hypothyroidism  Hypothyroidism is a disorder of the thyroid. The thyroid is a large gland that is located in the lower front of the neck. The thyroid releases hormones that control how the body works. With hypothyroidism, the thyroid does not make enough of these hormones.  What are the causes?  Causes of hypothyroidism may include:  · Viral infections.  · Pregnancy.  · Your own defense system (immune system) attacking your thyroid.  · Certain medicines.  · Birth defects.  · Past radiation treatments to your head or neck.  · Past treatment with radioactive iodine.  · Past surgical removal of part or all of your thyroid.  · Problems with the gland that is located in the center of your brain (pituitary).  What are the signs or symptoms?  Signs and symptoms of hypothyroidism may include:  · Feeling as though you have no energy (lethargy).  · Inability to tolerate cold.  · Weight gain that is not explained by a change in diet or exercise habits.  · Dry skin.  · Coarse hair.  · Menstrual irregularity.  · Slowing of thought processes.  · Constipation.  · Sadness or depression.  How is this diagnosed?  Your health care provider may diagnose hypothyroidism with blood tests and ultrasound tests.  How is this treated?  Hypothyroidism is treated with medicine that replaces the hormones that your body does not make. After you begin treatment, it may take several weeks for symptoms to go away.  Follow these instructions at home:  · Take medicines only as directed by your health care provider.  · If you start taking any new medicines, tell your health care provider.  · Keep all follow-up visits as directed by your health care provider. This is important. As your condition improves, your dosage needs may change. You will need to have blood tests regularly so that your health care provider can watch your condition.  Contact a health care provider if:  · Your symptoms do not get better with treatment.  · You are taking thyroid  replacement medicine and:  ¨ You sweat excessively.  ¨ You have tremors.  ¨ You feel anxious.  ¨ You lose weight rapidly.  ¨ You cannot tolerate heat.  ¨ You have emotional swings.  ¨ You have diarrhea.  ¨ You feel weak.  Get help right away if:  · You develop chest pain.  · You develop an irregular heartbeat.  · You develop a rapid heartbeat.  This information is not intended to replace advice given to you by your health care provider. Make sure you discuss any questions you have with your health care provider.  Document Released: 12/18/2006 Document Revised: 05/25/2017 Document Reviewed: 05/05/2015  [employer] Interactive Patient Education © 2017 [employer]

## 2020-01-13 NOTE — PROGRESS NOTES
CC:  Chief Complaint   Patient presents with   • Annual Exam   • Medication Refill     Levothyroxine     Gasper Bates is a 39 y.o. female presents for a routine preventive health exam.    Health Maintenance: Completed    Acquired hypothyroidism  Chronic, stable.    Currently taking levothyroxine 50 mcg daily as prescribed.   Due for updated lab work.  Denies symptoms including mood changes, anxiety/depression, chest pain/pressure, palpitations, fatigue, heat/cold intolerance, polydipsia, polyuria, diarrhea/constipation, abdominal pain, unexpected weight change, skin changes, hair thickening/coarsening/falling out/thinning, brittle nails, or edema.   2016 16:44 2016 13:59 2016 14:56 2018 15:13   TSH 1.640 0.820 1.160 1.770   Free T-4 0.66  0.81    T3,Free   2.82        Vitamin D insufficiency  Chronic, ongoing.  Denies any muscle weakness.  No history of CKD.  Reports having a good diet, including dairy products.  No history of IBD's, celiac, CF, or surgeries causing malabsorption.  Is taking vitamin d supplement, 5000 units/day.   Due for updated lab work.   2016 14:56 2018 15:13   25-Hydroxy   Vitamin D 25 19 (L) 26 (L)       Iron deficiency anemia secondary to inadequate dietary iron intake  Chronic problem for the patient that is ongoing.  Patient reports that she was previously taking iron supplements, but stopped the supplement due to constipation.  Patient declines updating labs at this time.    Migraine with aura and without status migrainosus, not intractable  Chronic problem for the patient that is ongoing and stable.  The patient was previously following with neurology routinely, but now only follows up as needed.  Patient reports that her last migraine was approximately 6 months ago, which is improved.  Patient reports that prescribe triptan's do not usually help during an episode, and only make her nauseous.    Ob-Gyn/ History:    /Para:  2/1  Last Pap  Smear: 9/3/2019, due 2020.  Positive history of abnormal pap smears.  Gyn Surgery: Colposcopy, hysteroscopy, .  Current Contraceptive Method:  Not currently sexually active.  Last menstrual period: Periods regular  Urinary incontinence: None  Folate intake: No    Screening/Preventative Topics:  Advanced directive: N/A  Osteoporosis Screen/ DEXA: n/a   Diabetes Screening: 10/23/2018  AAA Screening: n/a  Aspirin Use: N/A  Diet: Good  Exercise: Sometimes   Substance Abuse: No  Safe in relationship N/A  Sun protection used.    Cancer screening  Colorectal Cancer Screening: N/A  Lung Cancer Screening: N/A  Cervical Cancer Screenin/3/2019  Breast Cancer Screening: Due 3/27/2020    Infectious disease screening  --STI Screening: Declines  --Practices safe sex.  --HIV Screening: Declines  --Syphilis Screening: Declines  --Hepatitis C Screening: N/A    Patient Active Problem List    Diagnosis Date Noted   • Vitamin D insufficiency 2018   • Acquired hypothyroidism 2018   • Iron deficiency anemia secondary to inadequate dietary iron intake 2018   • Migraine with aura and without status migrainosus, not intractable 2018      Allergies:Nkda [no known drug allergy]  Current Outpatient Medications   Medication Sig Dispense Refill   • levothyroxine (SYNTHROID) 50 MCG Tab Take 1 Tab by mouth every day. 90 Tab 0   • sumatriptan (IMITREX) 100 MG tablet 1 tab at headache onset; repeat in 1 hour prn 9 Tab 6   • vitamin D (CHOLECALCIFEROL) 1000 UNIT Tab Take 5,000 Units by mouth every day.     • Cyanocobalamin (VITAMIN B12 PO) Take 1 Tab by mouth every day.     • Omega-3 Fatty Acids (FISH OIL) 1000 MG Cap capsule Take 1,000 mg by mouth every day.       No current facility-administered medications for this visit.      Social History     Tobacco Use   • Smoking status: Never Smoker   • Smokeless tobacco: Never Used   Substance Use Topics   • Alcohol use: Yes     Alcohol/week: 1.2 oz     Types: 2  "Standard drinks or equivalent per week     Comment: occ   • Drug use: No     Social History     Patient does not qualify to have social determinant information on file (likely too young).   Social History Narrative   • Not on file     Family History   Problem Relation Age of Onset   • No Known Problems Mother    • Alcohol/Drug Father    • Diabetes Sister    • Hyperlipidemia Sister    • No Known Problems Maternal Aunt    • No Known Problems Maternal Uncle    • No Known Problems Maternal Grandmother    • No Known Problems Maternal Grandfather    • No Known Problems Paternal Grandmother    • Kidney Disease Paternal Grandfather    • Diabetes Paternal Grandfather      Review of Systems:    Constitutional: No Fevers, Chills  Eyes: No vision changes  ENT: No hearing changes  Resp: No Shortness of breath  CV: No Chest pain  GI: No Nausea/Vomiting  MSK: No weakness  Skin: No rashes  Neuro: No Headaches  Psych: No Suicidal ideations    All remaining systems reviewed and found to be negative, except as stated above.    Exam:    /66 (BP Location: Left arm, Patient Position: Sitting, BP Cuff Size: Adult)   Pulse 64   Temp 36.6 °C (97.9 °F) (Temporal)   Ht 1.575 m (5' 2\")   Wt 62.6 kg (138 lb)   SpO2 97%  Body mass index is 25.24 kg/m².    General:  Well nourished, well developed female in NAD  HENT: Atraumatic, normocephalic  EYES: Extraocular movements intact, pupils equal and reactive to light  NECK: Supple, FROM  CHEST: No deformities, Equal chest expansion  RESP: Unlabored, no stridor or audible wheeze  ABD: Soft, Nontender, Non-Distended  Extremities: No Clubbing, Cyanosis, or Edema  Skin: Warm/dry, without rashes  Neuro: A/O x 4, CN 2-12 Grossly intact, Motor/sensory grossly intact  Psych: Normal behavior, normal affect    Assessment/Plan:  1. Encounter for well adult exam without abnormal findings    2. Acquired hypothyroidism  Continue levothyroxine 50 mcg/day, does not need a refill at this time.  Due for " updated labs.  - TSH WITH REFLEX TO FT4; Future    3. Vitamin D insufficiency  Continue over-the-counter vitamin D 5000 units/day.  Due for updated labs.  - VITAMIN D,25 HYDROXY; Future    4. Migraine with aura and without status migrainosus, not intractable  Continue follow-up with neurology as needed.  Continue to use triptan's and over-the-counter medications as needed for migraines.  Does not need refills.    5. Iron deficiency anemia secondary to inadequate dietary iron intake  Chronic, ongoing.  Declines to update labs.  No longer taking iron supplement due to constipation side effect.    Patient up-to-date on preventative health maintenance examinations and vaccinations.  Age-appropriate anticipatory guidance provided today.    Return in about 1 year (around 1/13/2021) for Preventative Annual, Thyroid, As needed.    Please note that this dictation was created using voice recognition software. I have worked with consultants from the vendor as well as technical experts from [facility] to optimize the interface. I have made every reasonable attempt to correct obvious errors, but I expect that there are errors of grammar and possibly content that I did not discover before finalizing the note.

## 2020-01-13 NOTE — ASSESSMENT & PLAN NOTE
Chronic, stable.    Currently taking levothyroxine 50 mcg daily as prescribed.   Due for updated lab work.  Denies symptoms including mood changes, anxiety/depression, chest pain/pressure, palpitations, fatigue, heat/cold intolerance, polydipsia, polyuria, diarrhea/constipation, abdominal pain, unexpected weight change, skin changes, hair thickening/coarsening/falling out/thinning, brittle nails, or edema.   2/22/2016 16:44 6/18/2016 13:59 12/29/2016 14:56 11/27/2018 15:13   TSH 1.640 0.820 1.160 1.770   Free T-4 0.66  0.81    T3,Free   2.82

## 2020-03-23 DIAGNOSIS — E03.4 HYPOTHYROIDISM DUE TO ACQUIRED ATROPHY OF THYROID: ICD-10-CM

## 2020-03-23 RX ORDER — LEVOTHYROXINE SODIUM 0.05 MG/1
TABLET ORAL
Qty: 90 TAB | Refills: 0 | Status: SHIPPED | OUTPATIENT
Start: 2020-03-23 | End: 2020-06-25

## 2020-03-23 NOTE — TELEPHONE ENCOUNTER
Requested Prescriptions     Pending Prescriptions Disp Refills   • levothyroxine (SYNTHROID) 50 MCG Tab [Pharmacy Med Name: LEVOTHYROXINE 50 MCG TABLET] 90 Tab 0     Sig: TAKE ONE TABLET BY MOUTH DAILY (SYNTHROID)       LIBERTY Yang.

## 2020-03-23 NOTE — TELEPHONE ENCOUNTER
Received request via: Pharmacy    Was the patient seen in the last year in this department? Yes 1/13/20    Does the patient have an active prescription (recently filled or refills available) for medication(s) requested? No

## 2020-06-25 DIAGNOSIS — E03.4 HYPOTHYROIDISM DUE TO ACQUIRED ATROPHY OF THYROID: ICD-10-CM

## 2020-06-25 RX ORDER — LEVOTHYROXINE SODIUM 0.05 MG/1
TABLET ORAL
Qty: 90 TAB | Refills: 3 | Status: SHIPPED | OUTPATIENT
Start: 2020-06-25 | End: 2021-01-18 | Stop reason: SDUPTHER

## 2020-06-25 NOTE — TELEPHONE ENCOUNTER
Received request via: Pharmacy    Was the patient seen in the last year in this department? Yes lov 01/13/2020    Does the patient have an active prescription (recently filled or refills available) for medication(s) requested? No

## 2020-06-25 NOTE — TELEPHONE ENCOUNTER
Requested Prescriptions     Pending Prescriptions Disp Refills   • levothyroxine (SYNTHROID) 50 MCG Tab [Pharmacy Med Name: LEVOTHYROXINE 50 MCG TABLET] 90 Tab 3     Sig: TAKE ONE TABLET BY MOUTH DAILY (SYNTHROID)       LIBERTY Yang.

## 2020-09-25 ENCOUNTER — IMMUNIZATION (OUTPATIENT)
Dept: OCCUPATIONAL MEDICINE | Facility: CLINIC | Age: 40
End: 2020-09-25

## 2020-09-25 DIAGNOSIS — Z23 NEED FOR VACCINATION: ICD-10-CM

## 2020-09-25 PROCEDURE — 90686 IIV4 VACC NO PRSV 0.5 ML IM: CPT | Performed by: NURSE PRACTITIONER

## 2020-09-30 DIAGNOSIS — Z00.00 ROUTINE HEALTH MAINTENANCE: ICD-10-CM

## 2020-09-30 DIAGNOSIS — Z13.220 SCREENING FOR LIPID DISORDERS: ICD-10-CM

## 2020-09-30 DIAGNOSIS — E03.9 ACQUIRED HYPOTHYROIDISM: ICD-10-CM

## 2020-09-30 DIAGNOSIS — D50.8 IRON DEFICIENCY ANEMIA SECONDARY TO INADEQUATE DIETARY IRON INTAKE: ICD-10-CM

## 2020-09-30 DIAGNOSIS — Z12.31 ENCOUNTER FOR SCREENING MAMMOGRAM FOR BREAST CANCER: ICD-10-CM

## 2020-09-30 DIAGNOSIS — E55.9 VITAMIN D INSUFFICIENCY: ICD-10-CM

## 2020-09-30 NOTE — PROGRESS NOTES
1. Routine health maintenance  - REFERRAL TO GYNECOLOGY  - CBC WITH DIFFERENTIAL; Future  - Comp Metabolic Panel; Future  - Lipid Profile; Future  - TSH WITH REFLEX TO FT4; Future  - VITAMIN D,25 HYDROXY; Future    2. Acquired hypothyroidism  - TSH WITH REFLEX TO FT4; Future    3. Iron deficiency anemia secondary to inadequate dietary iron intake  - CBC WITH DIFFERENTIAL; Future    4. Vitamin D insufficiency  - VITAMIN D,25 HYDROXY; Future    5. Screening for lipid disorders  - Lipid Profile; Future

## 2020-09-30 NOTE — PROGRESS NOTES
1. Encounter for screening mammogram for breast cancer  - MA-SCREENING MAMMO BILAT W/TOMOSYNTHESIS W/CAD; Future

## 2020-10-14 ENCOUNTER — HOSPITAL ENCOUNTER (OUTPATIENT)
Dept: LAB | Facility: MEDICAL CENTER | Age: 40
End: 2020-10-14
Attending: NURSE PRACTITIONER
Payer: COMMERCIAL

## 2020-10-14 DIAGNOSIS — D50.8 IRON DEFICIENCY ANEMIA SECONDARY TO INADEQUATE DIETARY IRON INTAKE: ICD-10-CM

## 2020-10-14 DIAGNOSIS — E03.9 ACQUIRED HYPOTHYROIDISM: ICD-10-CM

## 2020-10-14 DIAGNOSIS — Z13.220 SCREENING FOR LIPID DISORDERS: ICD-10-CM

## 2020-10-14 DIAGNOSIS — Z00.00 ROUTINE HEALTH MAINTENANCE: ICD-10-CM

## 2020-10-14 DIAGNOSIS — E55.9 VITAMIN D INSUFFICIENCY: ICD-10-CM

## 2020-10-14 LAB
25(OH)D3 SERPL-MCNC: 64 NG/ML (ref 30–100)
ALBUMIN SERPL BCP-MCNC: 4.5 G/DL (ref 3.2–4.9)
ALBUMIN/GLOB SERPL: 2 G/DL
ALP SERPL-CCNC: 46 U/L (ref 30–99)
ALT SERPL-CCNC: 23 U/L (ref 2–50)
ANION GAP SERPL CALC-SCNC: 9 MMOL/L (ref 7–16)
AST SERPL-CCNC: 19 U/L (ref 12–45)
BASOPHILS # BLD AUTO: 0.6 % (ref 0–1.8)
BASOPHILS # BLD: 0.03 K/UL (ref 0–0.12)
BILIRUB SERPL-MCNC: 0.5 MG/DL (ref 0.1–1.5)
BUN SERPL-MCNC: 21 MG/DL (ref 8–22)
CALCIUM SERPL-MCNC: 9 MG/DL (ref 8.5–10.5)
CHLORIDE SERPL-SCNC: 100 MMOL/L (ref 96–112)
CHOLEST SERPL-MCNC: 172 MG/DL (ref 100–199)
CO2 SERPL-SCNC: 24 MMOL/L (ref 20–33)
CREAT SERPL-MCNC: 0.79 MG/DL (ref 0.5–1.4)
EOSINOPHIL # BLD AUTO: 0.04 K/UL (ref 0–0.51)
EOSINOPHIL NFR BLD: 0.8 % (ref 0–6.9)
ERYTHROCYTE [DISTWIDTH] IN BLOOD BY AUTOMATED COUNT: 46.5 FL (ref 35.9–50)
FASTING STATUS PATIENT QL REPORTED: NORMAL
GLOBULIN SER CALC-MCNC: 2.3 G/DL (ref 1.9–3.5)
GLUCOSE SERPL-MCNC: 83 MG/DL (ref 65–99)
HCT VFR BLD AUTO: 37.7 % (ref 37–47)
HDLC SERPL-MCNC: 78 MG/DL
HGB BLD-MCNC: 12.3 G/DL (ref 12–16)
IMM GRANULOCYTES # BLD AUTO: 0.01 K/UL (ref 0–0.11)
IMM GRANULOCYTES NFR BLD AUTO: 0.2 % (ref 0–0.9)
LDLC SERPL CALC-MCNC: 84 MG/DL
LYMPHOCYTES # BLD AUTO: 1.5 K/UL (ref 1–4.8)
LYMPHOCYTES NFR BLD: 30.6 % (ref 22–41)
MCH RBC QN AUTO: 32.7 PG (ref 27–33)
MCHC RBC AUTO-ENTMCNC: 32.6 G/DL (ref 33.6–35)
MCV RBC AUTO: 100.3 FL (ref 81.4–97.8)
MONOCYTES # BLD AUTO: 0.35 K/UL (ref 0–0.85)
MONOCYTES NFR BLD AUTO: 7.1 % (ref 0–13.4)
NEUTROPHILS # BLD AUTO: 2.97 K/UL (ref 2–7.15)
NEUTROPHILS NFR BLD: 60.7 % (ref 44–72)
NRBC # BLD AUTO: 0 K/UL
NRBC BLD-RTO: 0 /100 WBC
PLATELET # BLD AUTO: 172 K/UL (ref 164–446)
PMV BLD AUTO: 9.6 FL (ref 9–12.9)
POTASSIUM SERPL-SCNC: 3.6 MMOL/L (ref 3.6–5.5)
PROT SERPL-MCNC: 6.8 G/DL (ref 6–8.2)
RBC # BLD AUTO: 3.76 M/UL (ref 4.2–5.4)
SODIUM SERPL-SCNC: 133 MMOL/L (ref 135–145)
TRIGL SERPL-MCNC: 50 MG/DL (ref 0–149)
TSH SERPL DL<=0.005 MIU/L-ACNC: 3.84 UIU/ML (ref 0.38–5.33)
WBC # BLD AUTO: 4.9 K/UL (ref 4.8–10.8)

## 2020-10-14 PROCEDURE — 80053 COMPREHEN METABOLIC PANEL: CPT

## 2020-10-14 PROCEDURE — 36415 COLL VENOUS BLD VENIPUNCTURE: CPT

## 2020-10-14 PROCEDURE — 80061 LIPID PANEL: CPT

## 2020-10-14 PROCEDURE — 85025 COMPLETE CBC W/AUTO DIFF WBC: CPT

## 2020-10-14 PROCEDURE — 84443 ASSAY THYROID STIM HORMONE: CPT

## 2020-10-14 PROCEDURE — 82306 VITAMIN D 25 HYDROXY: CPT

## 2020-10-15 DIAGNOSIS — D75.89 MACROCYTOSIS WITHOUT ANEMIA: ICD-10-CM

## 2020-10-15 NOTE — PROGRESS NOTES
1. Macrocytosis without anemia  - VITAMIN B12; Future  - FOLATE; Future  - INTRINSIC FACTOR AB; Future

## 2020-10-22 ENCOUNTER — HOSPITAL ENCOUNTER (OUTPATIENT)
Dept: RADIOLOGY | Facility: MEDICAL CENTER | Age: 40
End: 2020-10-22
Attending: NURSE PRACTITIONER
Payer: COMMERCIAL

## 2020-10-22 DIAGNOSIS — Z12.31 ENCOUNTER FOR SCREENING MAMMOGRAM FOR BREAST CANCER: ICD-10-CM

## 2020-10-22 PROCEDURE — 77067 SCR MAMMO BI INCL CAD: CPT

## 2020-11-14 ENCOUNTER — HOSPITAL ENCOUNTER (OUTPATIENT)
Dept: LAB | Facility: MEDICAL CENTER | Age: 40
End: 2020-11-14
Attending: NURSE PRACTITIONER
Payer: COMMERCIAL

## 2020-11-14 DIAGNOSIS — J02.9 SORE THROAT: ICD-10-CM

## 2020-11-14 DIAGNOSIS — G44.89 OTHER HEADACHE SYNDROME: ICD-10-CM

## 2020-11-14 DIAGNOSIS — R52 BODY ACHES: ICD-10-CM

## 2020-11-14 PROCEDURE — C9803 HOPD COVID-19 SPEC COLLECT: HCPCS

## 2020-11-14 PROCEDURE — U0003 INFECTIOUS AGENT DETECTION BY NUCLEIC ACID (DNA OR RNA); SEVERE ACUTE RESPIRATORY SYNDROME CORONAVIRUS 2 (SARS-COV-2) (CORONAVIRUS DISEASE [COVID-19]), AMPLIFIED PROBE TECHNIQUE, MAKING USE OF HIGH THROUGHPUT TECHNOLOGIES AS DESCRIBED BY CMS-2020-01-R: HCPCS

## 2020-11-14 NOTE — PROGRESS NOTES
1. Body aches  - COVID/SARS CoV-2 PCR; Future    2. Sore throat  - COVID/SARS CoV-2 PCR; Future    3. Other headache syndrome  - COVID/SARS CoV-2 PCR; Future

## 2020-11-17 ENCOUNTER — TELEPHONE (OUTPATIENT)
Dept: MEDICAL GROUP | Facility: PHYSICIAN GROUP | Age: 40
End: 2020-11-17

## 2020-11-17 DIAGNOSIS — R52 BODY ACHES: ICD-10-CM

## 2020-11-17 DIAGNOSIS — R51.9 NONINTRACTABLE HEADACHE, UNSPECIFIED CHRONICITY PATTERN, UNSPECIFIED HEADACHE TYPE: ICD-10-CM

## 2020-11-17 DIAGNOSIS — J02.9 SORE THROAT: ICD-10-CM

## 2020-11-17 LAB
COVID ORDER STATUS COVID19: NORMAL
SARS-COV-2 RNA RESP QL NAA+PROBE: NOTDETECTED
SPECIMEN SOURCE: NORMAL

## 2020-11-17 NOTE — TELEPHONE ENCOUNTER
I called [facility] and they informed me the requisition was lost, [phone] and they will resume testing.

## 2020-11-17 NOTE — PROGRESS NOTES
1. Nonintractable headache, unspecified chronicity pattern, unspecified headache type  - COVID/SARS CoV-2 PCR; Future    2. Sore throat  - COVID/SARS CoV-2 PCR; Future    3. Body aches  - COVID/SARS CoV-2 PCR; Future    Specimen was sent to lab without requisition.  Lab is requesting new order, states they have sample in lab.

## 2020-11-17 NOTE — TELEPHONE ENCOUNTER
----- Message from MARCEL Yang sent at 11/17/2020 11:56 AM PST -----  This patient had her COVID test on Saturday at that [facility] thru. It is showing as discontinued. Can you check with the lab to confirm that it was received.    Thank you!

## 2020-11-19 ENCOUNTER — HOSPITAL ENCOUNTER (OUTPATIENT)
Dept: LAB | Facility: MEDICAL CENTER | Age: 40
End: 2020-11-19
Attending: PHYSICIAN ASSISTANT
Payer: COMMERCIAL

## 2020-11-19 PROCEDURE — 88175 CYTOPATH C/V AUTO FLUID REDO: CPT

## 2020-12-10 LAB — CYTOLOGY REG CYTOL: NORMAL

## 2020-12-16 ENCOUNTER — HOSPITAL ENCOUNTER (OUTPATIENT)
Dept: LAB | Facility: MEDICAL CENTER | Age: 40
End: 2020-12-16
Attending: EMERGENCY MEDICINE
Payer: COMMERCIAL

## 2020-12-16 DIAGNOSIS — Z23 NEED FOR VACCINATION: ICD-10-CM

## 2020-12-16 LAB
COVID ORDER STATUS COVID19: NORMAL
SARS-COV-2 RNA RESP QL NAA+PROBE: DETECTED
SPECIMEN SOURCE: ABNORMAL

## 2021-01-18 DIAGNOSIS — E03.4 HYPOTHYROIDISM DUE TO ACQUIRED ATROPHY OF THYROID: ICD-10-CM

## 2021-01-18 RX ORDER — LEVOTHYROXINE SODIUM 0.05 MG/1
TABLET ORAL
Qty: 90 TAB | Refills: 3 | Status: SHIPPED | OUTPATIENT
Start: 2021-01-18 | End: 2021-06-14 | Stop reason: SDUPTHER

## 2021-01-18 NOTE — TELEPHONE ENCOUNTER
Requested Prescriptions     Pending Prescriptions Disp Refills   • levothyroxine (SYNTHROID) 50 MCG Tab 90 Tab 3     Sig: TAKE ONE TABLET BY MOUTH DAILY (SYNTHROID)       Waleska Carmona A.P.R.N.

## 2021-01-18 NOTE — TELEPHONE ENCOUNTER
Received request via: Pharmacy    Was the patient seen in the last year in this department? Yes 7/24/20    Does the patient have an active prescription (recently filled or refills available) for medication(s) requested? No

## 2021-02-11 ENCOUNTER — IMMUNIZATION (OUTPATIENT)
Dept: FAMILY PLANNING/WOMEN'S HEALTH CLINIC | Facility: IMMUNIZATION CENTER | Age: 41
End: 2021-02-11
Attending: FAMILY MEDICINE
Payer: COMMERCIAL

## 2021-02-11 DIAGNOSIS — Z23 ENCOUNTER FOR VACCINATION: Primary | ICD-10-CM

## 2021-02-11 DIAGNOSIS — Z23 NEED FOR VACCINATION: ICD-10-CM

## 2021-02-11 PROCEDURE — 91301 MODERNA SARS-COV-2 VACCINE: CPT

## 2021-02-11 PROCEDURE — 0011A MODERNA SARS-COV-2 VACCINE: CPT

## 2021-03-25 DIAGNOSIS — Z00.00 ROUTINE HEALTH MAINTENANCE: ICD-10-CM

## 2021-03-25 DIAGNOSIS — B35.1 ONYCHOMYCOSIS: ICD-10-CM

## 2021-03-25 NOTE — PROGRESS NOTES
1. Routine health maintenance  2. Onychomycosis  Patient interested in starting terbinafine, plan to check CMP prior to starting medication.  - Comp Metabolic Panel; Future

## 2021-03-30 ENCOUNTER — HOSPITAL ENCOUNTER (OUTPATIENT)
Dept: LAB | Facility: MEDICAL CENTER | Age: 41
End: 2021-03-30
Attending: NURSE PRACTITIONER
Payer: COMMERCIAL

## 2021-03-30 DIAGNOSIS — D75.89 MACROCYTOSIS WITHOUT ANEMIA: ICD-10-CM

## 2021-03-30 DIAGNOSIS — B35.1 ONYCHOMYCOSIS: ICD-10-CM

## 2021-03-30 DIAGNOSIS — Z00.00 ROUTINE HEALTH MAINTENANCE: ICD-10-CM

## 2021-03-30 LAB
ALBUMIN SERPL BCP-MCNC: 4.6 G/DL (ref 3.2–4.9)
ALBUMIN/GLOB SERPL: 1.9 G/DL
ALP SERPL-CCNC: 52 U/L (ref 30–99)
ALT SERPL-CCNC: 13 U/L (ref 2–50)
ANION GAP SERPL CALC-SCNC: 8 MMOL/L (ref 7–16)
AST SERPL-CCNC: 18 U/L (ref 12–45)
BILIRUB SERPL-MCNC: 0.6 MG/DL (ref 0.1–1.5)
BUN SERPL-MCNC: 17 MG/DL (ref 8–22)
CALCIUM SERPL-MCNC: 9.7 MG/DL (ref 8.5–10.5)
CHLORIDE SERPL-SCNC: 106 MMOL/L (ref 96–112)
CO2 SERPL-SCNC: 26 MMOL/L (ref 20–33)
CREAT SERPL-MCNC: 0.78 MG/DL (ref 0.5–1.4)
FASTING STATUS PATIENT QL REPORTED: NORMAL
FOLATE SERPL-MCNC: 16.1 NG/ML
GLOBULIN SER CALC-MCNC: 2.4 G/DL (ref 1.9–3.5)
GLUCOSE SERPL-MCNC: 87 MG/DL (ref 65–99)
POTASSIUM SERPL-SCNC: 4 MMOL/L (ref 3.6–5.5)
PROT SERPL-MCNC: 7 G/DL (ref 6–8.2)
SODIUM SERPL-SCNC: 140 MMOL/L (ref 135–145)
VIT B12 SERPL-MCNC: 639 PG/ML (ref 211–911)

## 2021-03-30 PROCEDURE — 36415 COLL VENOUS BLD VENIPUNCTURE: CPT

## 2021-03-30 PROCEDURE — 82607 VITAMIN B-12: CPT

## 2021-03-30 PROCEDURE — 86340 INTRINSIC FACTOR ANTIBODY: CPT

## 2021-03-30 PROCEDURE — 82746 ASSAY OF FOLIC ACID SERUM: CPT

## 2021-03-30 PROCEDURE — 80053 COMPREHEN METABOLIC PANEL: CPT

## 2021-03-31 DIAGNOSIS — B35.1 ONYCHOMYCOSIS: ICD-10-CM

## 2021-03-31 RX ORDER — TERBINAFINE HYDROCHLORIDE 250 MG/1
250 TABLET ORAL DAILY
Qty: 84 TABLET | Refills: 0 | Status: SHIPPED | OUTPATIENT
Start: 2021-03-31 | End: 2021-06-23

## 2021-03-31 NOTE — PROGRESS NOTES
1. Onychomycosis  - terbinafine (LAMISIL) 250 MG Tab; Take 1 tablet by mouth every day for 84 days.  Dispense: 84 tablet; Refill: 0

## 2021-04-01 LAB — IF BLOCK AB SER QL RIA: NEGATIVE

## 2021-04-10 ENCOUNTER — IMMUNIZATION (OUTPATIENT)
Dept: FAMILY PLANNING/WOMEN'S HEALTH CLINIC | Facility: IMMUNIZATION CENTER | Age: 41
End: 2021-04-10
Attending: INTERNAL MEDICINE
Payer: COMMERCIAL

## 2021-04-10 DIAGNOSIS — Z23 ENCOUNTER FOR VACCINATION: Primary | ICD-10-CM

## 2021-04-10 PROCEDURE — 91301 MODERNA SARS-COV-2 VACCINE: CPT

## 2021-04-10 PROCEDURE — 0012A MODERNA SARS-COV-2 VACCINE: CPT

## 2021-06-12 ENCOUNTER — PATIENT MESSAGE (OUTPATIENT)
Dept: MEDICAL GROUP | Facility: PHYSICIAN GROUP | Age: 41
End: 2021-06-12

## 2021-06-12 DIAGNOSIS — E03.4 HYPOTHYROIDISM DUE TO ACQUIRED ATROPHY OF THYROID: ICD-10-CM

## 2021-06-14 RX ORDER — LEVOTHYROXINE SODIUM 0.05 MG/1
TABLET ORAL
Qty: 90 TABLET | Refills: 0 | Status: SHIPPED | OUTPATIENT
Start: 2021-06-14 | End: 2021-09-28

## 2021-06-14 NOTE — PATIENT COMMUNICATION
Received request via: Patient    Was the patient seen in the last year in this department? No -LAST SEEN 1/13/2020    Does the patient have an active prescription (recently filled or refills available) for medication(s) requested? YES-REQUESTING CHANGE OF PHARMACIES

## 2021-06-15 NOTE — PROGRESS NOTES
Requested Prescriptions     Signed Prescriptions Disp Refills   • levothyroxine (SYNTHROID) 50 MCG Tab 90 tablet 0     Sig: TAKE ONE TABLET BY MOUTH DAILY (SYNTHROID)     Authorizing Provider: MANDI OCHOA A.P.R.N.

## 2021-09-26 DIAGNOSIS — E03.4 HYPOTHYROIDISM DUE TO ACQUIRED ATROPHY OF THYROID: ICD-10-CM

## 2021-09-28 RX ORDER — LEVOTHYROXINE SODIUM 0.05 MG/1
50 TABLET ORAL
Qty: 90 TABLET | Refills: 0 | Status: SHIPPED | OUTPATIENT
Start: 2021-09-28 | End: 2021-12-22

## 2021-09-28 NOTE — TELEPHONE ENCOUNTER
Requested Prescriptions     Pending Prescriptions Disp Refills   • levothyroxine (SYNTHROID) 50 MCG Tab [Pharmacy Med Name: LEVOTHYROXINE 50 MCG TABLET] 90 Tablet 0     Sig: Take 1 Tablet by mouth every morning on an empty stomach.       LIBERTY Yang.

## 2021-10-25 ENCOUNTER — HOSPITAL ENCOUNTER (OUTPATIENT)
Dept: RADIOLOGY | Facility: MEDICAL CENTER | Age: 41
End: 2021-10-25
Attending: NURSE PRACTITIONER
Payer: COMMERCIAL

## 2021-10-25 ENCOUNTER — OFFICE VISIT (OUTPATIENT)
Dept: MEDICAL GROUP | Facility: PHYSICIAN GROUP | Age: 41
End: 2021-10-25
Payer: COMMERCIAL

## 2021-10-25 VITALS
SYSTOLIC BLOOD PRESSURE: 118 MMHG | DIASTOLIC BLOOD PRESSURE: 66 MMHG | TEMPERATURE: 97.6 F | HEIGHT: 62 IN | WEIGHT: 137 LBS | BODY MASS INDEX: 25.21 KG/M2 | HEART RATE: 66 BPM | OXYGEN SATURATION: 98 %

## 2021-10-25 DIAGNOSIS — E78.5 DYSLIPIDEMIA: ICD-10-CM

## 2021-10-25 DIAGNOSIS — G43.109 MIGRAINE WITH AURA AND WITHOUT STATUS MIGRAINOSUS, NOT INTRACTABLE: ICD-10-CM

## 2021-10-25 DIAGNOSIS — Z12.31 VISIT FOR SCREENING MAMMOGRAM: ICD-10-CM

## 2021-10-25 DIAGNOSIS — Z00.00 ENCOUNTER FOR WELL ADULT EXAM WITHOUT ABNORMAL FINDINGS: ICD-10-CM

## 2021-10-25 DIAGNOSIS — E55.9 VITAMIN D INSUFFICIENCY: ICD-10-CM

## 2021-10-25 DIAGNOSIS — E03.9 ACQUIRED HYPOTHYROIDISM: ICD-10-CM

## 2021-10-25 DIAGNOSIS — Z23 NEED FOR VACCINATION: ICD-10-CM

## 2021-10-25 DIAGNOSIS — Z00.00 ROUTINE HEALTH MAINTENANCE: ICD-10-CM

## 2021-10-25 DIAGNOSIS — D50.8 IRON DEFICIENCY ANEMIA SECONDARY TO INADEQUATE DIETARY IRON INTAKE: ICD-10-CM

## 2021-10-25 DIAGNOSIS — L98.9 SKIN LESION: ICD-10-CM

## 2021-10-25 PROCEDURE — 90471 IMMUNIZATION ADMIN: CPT | Performed by: NURSE PRACTITIONER

## 2021-10-25 PROCEDURE — 77063 BREAST TOMOSYNTHESIS BI: CPT

## 2021-10-25 PROCEDURE — 99396 PREV VISIT EST AGE 40-64: CPT | Mod: 25 | Performed by: NURSE PRACTITIONER

## 2021-10-25 PROCEDURE — 90686 IIV4 VACC NO PRSV 0.5 ML IM: CPT | Performed by: NURSE PRACTITIONER

## 2021-10-25 ASSESSMENT — FIBROSIS 4 INDEX: FIB4 SCORE: 1.19

## 2021-10-25 ASSESSMENT — PATIENT HEALTH QUESTIONNAIRE - PHQ9: CLINICAL INTERPRETATION OF PHQ2 SCORE: 0

## 2021-10-25 NOTE — LETTER
[facility]  MARCEL Yang  [address]  [place] [address]  Fax: [phone]   Authorization for Release/Disclosure of   Protected Health Information   Name: BAO ESTEVES : 1980 SSN: [id number]   Address: [address] [place], [place] [address] [address] Phone:    [phone] (home)    I authorize the entity listed below to release/disclose the PHI below to:   [facility]/JEYSON YangRPARRISH and MARCEL Yang   Provider or Entity Name:  [facility]   Address   City, [facility]   Phone:      Fax:     Reason for request: continuity of care   Information to be released:    [  ] LAST COLONOSCOPY,  including any PATH REPORT and follow-up  [  ] LAST FIT/COLOGUARD RESULT [  ] LAST DEXA  [  ] LAST MAMMOGRAM  [ xx ] LAST PAP  [ xx ] LAST LABS [  ] RETINA EXAM REPORT  [  ] IMMUNIZATION RECORDS  [  ] Release all info      [  ] Check here and initial the line next to each item to release ALL health information INCLUDING  _____ Care and treatment for drug and / or alcohol abuse  _____ HIV testing, infection status, or AIDS  _____ Genetic Testing    DATES OF SERVICE OR TIME PERIOD TO BE DISCLOSED: _____________  I understand and acknowledge that:  * This Authorization may be revoked at any time by you in writing, except if your health information has already been used or disclosed.  * Your health information that will be used or disclosed as a result of you signing this authorization could be re-disclosed by the recipient. If this occurs, your re-disclosed health information may no longer be protected by State or Federal laws.  * You may refuse to sign this Authorization. Your refusal will not affect your ability to obtain treatment.  * This Authorization becomes effective upon signing and will  on (date) __________.      If no date is indicated, this Authorization will  one (1) year from the signature date.    Name: Bao Esteves    Signature:   Date:      10/25/2021       PLEASE FAX REQUESTED RECORDS BACK TO: [phone]

## 2021-10-25 NOTE — PROGRESS NOTES
"Subjective:   CC:   Chief Complaint   Patient presents with   • Annual Exam     Physcial exam     HPI:   Gasper Bates is a 41 y.o. female who presents for annual exam    Migraine with aura and without status migrainosus, not intractable  Chronic problem for the patient. No longer following with neurology. Patient is no longer taking Imitrex due to it not working and causing nausea. Patient describes headaches occurring for the last 2 weeks that last all day. Patient describes them as a dull ache and a \"braine freeze,\" 1-2/10 on pain scale. Patient has not identified any relieving or aggravating factors. Denies taking any over-the-counter medications for the headaches.     Skin lesion  New to examiner.  Patient states she noticed a small skin lesion on her left hand about 2 months ago.  Denies any pain and itching.  Patient requesting referral to dermatology.    Acquired hypothyroidism  Chronic, stable. Continue levothyroxine 50 mcg daily, due for updated labs.    Iron deficiency anemia secondary to inadequate dietary iron intake  Due for updated labs.    Vitamin D insufficiency  Chronic, ongoing for patient.  Continues vitamin D 5000 units daily.  Due for updated labs.     Patient has GYN provider: Yes  Last Pap Smear: 10/2020    H/O Abnormal Pap: Yes, abnormal cells  Last Mammogram: 10/2020  Last Bone Density Test: na  Last Colorectal Cancer Screening: na  Last Tdap: 2018  Received HPV series: No    Exercise: moderate regular exercise, aerobic < 3 days a week  Diet: Low-carb, high protein, vegetables, low-sugar      No LMP recorded.  Hx STDs: No  Birth control: No     Menses every month with 3 days with light, moderate bleeding.  Denies cramping.  No significant bloating/fluid retention, pelvic pain, or dyspareunia. No abnormal vaginal discharge.  No breast tenderness, mass, nipple discharge, changes in size or contour, or abnormal cyclic discomfort.    OB History    Para Term  AB Living "   2 1 0 1 1 2   SAB TAB Ectopic Molar Multiple Live Births   0 1 0 0 2 2      She  reports previously being sexually active and has had partner(s) who are male.  She  has a past medical history of Abnormal Pap smear of cervix, Anemia, Benign gestational thrombocytopenia in third trimester (HCC) (7/6/2016), Disorder of thyroid, Migraine, and Vitamin D insufficiency.  She  has a past surgical history that includes hysteroscopy with video diagnostic (11/5/2015); primary c section (7/2/2016); and colposcopy.    Family History   Problem Relation Age of Onset   • No Known Problems Mother    • Alcohol/Drug Father    • Diabetes Sister    • Hyperlipidemia Sister    • No Known Problems Maternal Aunt    • No Known Problems Maternal Uncle    • No Known Problems Maternal Grandmother    • No Known Problems Maternal Grandfather    • No Known Problems Paternal Grandmother    • Kidney Disease Paternal Grandfather    • Diabetes Paternal Grandfather      Social History     Tobacco Use   • Smoking status: Never Smoker   • Smokeless tobacco: Never Used   Vaping Use   • Vaping Use: Never used   Substance Use Topics   • Alcohol use: Yes     Alcohol/week: 1.2 oz     Types: 2 Standard drinks or equivalent per week     Comment: occ   • Drug use: No     Patient Active Problem List    Diagnosis Date Noted   • Skin lesion 10/25/2021   • Onychomycosis 03/25/2021   • Vitamin D insufficiency 11/27/2018   • Acquired hypothyroidism 11/27/2018   • Iron deficiency anemia secondary to inadequate dietary iron intake 11/27/2018   • Migraine with aura and without status migrainosus, not intractable 04/12/2018     Current Outpatient Medications   Medication Sig Dispense Refill   • levothyroxine (SYNTHROID) 50 MCG Tab Take 1 Tablet by mouth every morning on an empty stomach. 90 Tablet 0   • vitamin D (CHOLECALCIFEROL) 1000 UNIT Tab Take 5,000 Units by mouth every day.     • Cyanocobalamin (VITAMIN B12 PO) Take 1 Tab by mouth every day.     • Omega-3 Fatty  "Acids (FISH OIL) 1000 MG Cap capsule Take 1,000 mg by mouth every day.       No current facility-administered medications for this visit.     No Known Allergies  Review of Systems   Constitutional: Negative for fever, chills and malaise/fatigue.   HENT: Negative for congestion.    Eyes: Negative for pain.   Respiratory: Negative for cough and shortness of breath.    Cardiovascular: Negative for chest pain and leg swelling.   Gastrointestinal: Negative for nausea, vomiting, abdominal pain and diarrhea.   Genitourinary: Negative for dysuria and hematuria.   Skin: Negative for rash. +skin lesion on back of both hands    Neurological: Positive for headaches. Negative for dizziness, focal weakness.   Endo/Heme/Allergies: Does not bruise/bleed easily.   Psychiatric/Behavioral: Negative for depression.  The patient is not nervous/anxious.      Objective:   /66 (BP Location: Left arm, Patient Position: Sitting, BP Cuff Size: Adult)   Pulse 66   Temp 36.4 °C (97.6 °F) (Temporal)   Ht 1.575 m (5' 2\")   Wt 62.1 kg (137 lb)   SpO2 98%   BMI 25.06 kg/m²     Wt Readings from Last 4 Encounters:   10/25/21 62.1 kg (137 lb)   01/13/20 62.6 kg (138 lb)   03/14/19 59.1 kg (130 lb 6.4 oz)   02/22/19 60.3 kg (133 lb)     Physical Exam:  Constitutional: Well-developed and well-nourished. Not diaphoretic. No distress.   Skin: Skin is warm and dry. No rash noted. Small irregular light brown macule posterior bilateral hands.   Head: Atraumatic without lesions.  Eyes: Conjunctivae and extraocular motions are normal. Pupils are equal, round, and reactive to light. No scleral icterus.   Ears:  External ears unremarkable. Tympanic membranes clear and intact.  Nose: Nares patent. Septum midline. Turbinates without erythema nor edema. No discharge.   Mouth/Throat: Tongue normal. Oropharynx is clear and moist. Posterior pharynx without erythema or exudates.  Neck: Supple, trachea midline. Normal range of motion. No thyromegaly present. " No lymphadenopathy--cervical or supraclavicular.  Cardiovascular: Regular rate and rhythm, S1 and S2 without murmur, rubs, or gallops.    Respiratory: Effort normal. Clear to auscultation throughout. No adventitious sounds.   Abdomen: Soft, non tender, and without distention. Active bowel sounds in all four quadrants. No rebound, guarding.  Extremities: No cyanosis, clubbing, erythema, nor edema. Radial pulses intact and symmetric.   Musculoskeletal: All major joints AROM full in all directions without pain.  Neurological: Alert and oriented x 3. Grossly non-focal. Strength and sensation grossly intact.   Psychiatric:  Behavior, mood, and affect are appropriate.    Assessment and Plan:   1. Encounter for well adult exam without abnormal findings  Due for updated labs.  - CBC WITH DIFFERENTIAL; Future  - Comp Metabolic Panel; Future  - Lipid Profile; Future  - TSH WITH REFLEX TO FT4; Future  - VITAMIN D,25 HYDROXY; Future    2. Skin lesion  Referral to dermatology.  - REFERRAL TO DERMATOLOGY    3. Acquired hypothyroidism  Continue levothyroxine 50 mcg daily.  Due for updated labs.  - TSH WITH REFLEX TO FT4; Future    4. Iron deficiency anemia secondary to inadequate dietary iron intake  Due for updated labs.  - CBC WITH DIFFERENTIAL; Future    5. Dyslipidemia  Due for updated labs.  - Comp Metabolic Panel; Future  - Lipid Profile; Future    6. Vitamin D insufficiency  Continue taking vitamin D 5000 units daily.  Due for updated labs.  - VITAMIN D,25 HYDROXY; Future    7. Migraine with aura and without status migrainosus, not intractable  Chronic, ongoing.  Continue to monitor for triggers.  Return if headaches persist.    8. Routine health maintenance  Due for updated labs.  - CBC WITH DIFFERENTIAL; Future  - Comp Metabolic Panel; Future  - Lipid Profile; Future  - TSH WITH REFLEX TO FT4; Future  - VITAMIN D,25 HYDROXY; Future    9. Need for vaccination  Given today.  - INFLUENZA VACCINE QUAD INJ (PF)     I have  placed the below orders and discussed them with an approved delegating provider.  The MA is performing the below orders under the direction of Dr. Johnston.    Health maintenance: Up-to-date.  Labs per orders  Immunizations per orders  Patient counseled about skin care, diet, supplements, and exercise.  Discussed mammography screening, adequate intake of calcium and vitamin D, diet and exercise, colorectal cancer screening     Follow-up: Return in about 1 year (around 10/25/2022) for Preventative Annual, Follow up Labs.     Please note that this dictation was created using voice recognition software. I have worked with consultants from the vendor as well as technical experts from [employer] to optimize the interface. I have made every reasonable attempt to correct obvious errors, but I expect that there are errors of grammar and possibly content that I did not discover before finalizing the note.

## 2021-10-25 NOTE — ASSESSMENT & PLAN NOTE
New to examiner.  Patient states she noticed a small skin lesion on her left hand about 2 months ago.  Denies any pain and itching.  Patient requesting referral to dermatology.

## 2021-10-25 NOTE — ASSESSMENT & PLAN NOTE
"Chronic problem for the patient. No longer following with neurology. Patient is no longer taking Imitrex due to it not working and causing nausea. Patient describes headaches occurring for the last 2 weeks that last all day. Patient describes them as a dull ache and a \"braine freeze,\" 1-2/10 on pain scale. Patient has not identified any relieving or aggravating factors. Denies taking any over-the-counter medications for the headaches.   "

## 2021-11-23 ENCOUNTER — HOSPITAL ENCOUNTER (OUTPATIENT)
Facility: MEDICAL CENTER | Age: 41
End: 2021-11-23
Attending: PHYSICIAN ASSISTANT
Payer: COMMERCIAL

## 2021-11-23 PROCEDURE — 88175 CYTOPATH C/V AUTO FLUID REDO: CPT

## 2021-11-24 LAB — CYTOLOGY REG CYTOL: NORMAL

## 2021-12-22 DIAGNOSIS — E03.4 HYPOTHYROIDISM DUE TO ACQUIRED ATROPHY OF THYROID: ICD-10-CM

## 2021-12-22 RX ORDER — LEVOTHYROXINE SODIUM 0.05 MG/1
TABLET ORAL
Qty: 90 TABLET | Refills: 0 | Status: SHIPPED | OUTPATIENT
Start: 2021-12-22 | End: 2021-12-31 | Stop reason: SDUPTHER

## 2021-12-22 NOTE — TELEPHONE ENCOUNTER
Requested Prescriptions     Pending Prescriptions Disp Refills   • levothyroxine (SYNTHROID) 50 MCG Tab [Pharmacy Med Name: LEVOTHYROXINE 50 MCG TABLET] 90 Tablet 0     Sig: TAKE 1 TABLET BY MOUTH EVERY DAY IN THE MORNING ON AN EMPTY STOMACH       LIBERTY Yang.

## 2021-12-30 ENCOUNTER — HOSPITAL ENCOUNTER (OUTPATIENT)
Dept: LAB | Facility: MEDICAL CENTER | Age: 41
End: 2021-12-30
Attending: NURSE PRACTITIONER
Payer: COMMERCIAL

## 2021-12-30 DIAGNOSIS — E78.5 DYSLIPIDEMIA: ICD-10-CM

## 2021-12-30 DIAGNOSIS — E03.9 ACQUIRED HYPOTHYROIDISM: ICD-10-CM

## 2021-12-30 DIAGNOSIS — Z00.00 ROUTINE HEALTH MAINTENANCE: ICD-10-CM

## 2021-12-30 DIAGNOSIS — Z00.00 ENCOUNTER FOR WELL ADULT EXAM WITHOUT ABNORMAL FINDINGS: ICD-10-CM

## 2021-12-30 DIAGNOSIS — E55.9 VITAMIN D INSUFFICIENCY: ICD-10-CM

## 2021-12-30 DIAGNOSIS — D50.8 IRON DEFICIENCY ANEMIA SECONDARY TO INADEQUATE DIETARY IRON INTAKE: ICD-10-CM

## 2021-12-30 LAB
25(OH)D3 SERPL-MCNC: 33 NG/ML (ref 30–100)
ALBUMIN SERPL BCP-MCNC: 4.7 G/DL (ref 3.2–4.9)
ALBUMIN/GLOB SERPL: 2.1 G/DL
ALP SERPL-CCNC: 52 U/L (ref 30–99)
ALT SERPL-CCNC: 10 U/L (ref 2–50)
ANION GAP SERPL CALC-SCNC: 12 MMOL/L (ref 7–16)
AST SERPL-CCNC: 14 U/L (ref 12–45)
BASOPHILS # BLD AUTO: 0.9 % (ref 0–1.8)
BASOPHILS # BLD: 0.04 K/UL (ref 0–0.12)
BILIRUB SERPL-MCNC: 0.6 MG/DL (ref 0.1–1.5)
BUN SERPL-MCNC: 18 MG/DL (ref 8–22)
CALCIUM SERPL-MCNC: 9 MG/DL (ref 8.5–10.5)
CHLORIDE SERPL-SCNC: 106 MMOL/L (ref 96–112)
CHOLEST SERPL-MCNC: 193 MG/DL (ref 100–199)
CO2 SERPL-SCNC: 21 MMOL/L (ref 20–33)
CREAT SERPL-MCNC: 0.62 MG/DL (ref 0.5–1.4)
EOSINOPHIL # BLD AUTO: 0.07 K/UL (ref 0–0.51)
EOSINOPHIL NFR BLD: 1.5 % (ref 0–6.9)
ERYTHROCYTE [DISTWIDTH] IN BLOOD BY AUTOMATED COUNT: 48.2 FL (ref 35.9–50)
FASTING STATUS PATIENT QL REPORTED: NORMAL
GLOBULIN SER CALC-MCNC: 2.2 G/DL (ref 1.9–3.5)
GLUCOSE SERPL-MCNC: 93 MG/DL (ref 65–99)
HCT VFR BLD AUTO: 39.4 % (ref 37–47)
HDLC SERPL-MCNC: 83 MG/DL
HGB BLD-MCNC: 12.9 G/DL (ref 12–16)
IMM GRANULOCYTES # BLD AUTO: 0.01 K/UL (ref 0–0.11)
IMM GRANULOCYTES NFR BLD AUTO: 0.2 % (ref 0–0.9)
LDLC SERPL CALC-MCNC: 97 MG/DL
LYMPHOCYTES # BLD AUTO: 1.53 K/UL (ref 1–4.8)
LYMPHOCYTES NFR BLD: 33.5 % (ref 22–41)
MCH RBC QN AUTO: 32.7 PG (ref 27–33)
MCHC RBC AUTO-ENTMCNC: 32.7 G/DL (ref 33.6–35)
MCV RBC AUTO: 99.7 FL (ref 81.4–97.8)
MONOCYTES # BLD AUTO: 0.27 K/UL (ref 0–0.85)
MONOCYTES NFR BLD AUTO: 5.9 % (ref 0–13.4)
NEUTROPHILS # BLD AUTO: 2.65 K/UL (ref 2–7.15)
NEUTROPHILS NFR BLD: 58 % (ref 44–72)
NRBC # BLD AUTO: 0 K/UL
NRBC BLD-RTO: 0 /100 WBC
PLATELET # BLD AUTO: 166 K/UL (ref 164–446)
PMV BLD AUTO: 9.2 FL (ref 9–12.9)
POTASSIUM SERPL-SCNC: 4.4 MMOL/L (ref 3.6–5.5)
PROT SERPL-MCNC: 6.9 G/DL (ref 6–8.2)
RBC # BLD AUTO: 3.95 M/UL (ref 4.2–5.4)
SODIUM SERPL-SCNC: 139 MMOL/L (ref 135–145)
TRIGL SERPL-MCNC: 63 MG/DL (ref 0–149)
TSH SERPL DL<=0.005 MIU/L-ACNC: 3.82 UIU/ML (ref 0.38–5.33)
WBC # BLD AUTO: 4.6 K/UL (ref 4.8–10.8)

## 2021-12-30 PROCEDURE — 80053 COMPREHEN METABOLIC PANEL: CPT

## 2021-12-30 PROCEDURE — 85025 COMPLETE CBC W/AUTO DIFF WBC: CPT

## 2021-12-30 PROCEDURE — 36415 COLL VENOUS BLD VENIPUNCTURE: CPT

## 2021-12-30 PROCEDURE — 84443 ASSAY THYROID STIM HORMONE: CPT

## 2021-12-30 PROCEDURE — 80061 LIPID PANEL: CPT

## 2021-12-30 PROCEDURE — 82306 VITAMIN D 25 HYDROXY: CPT

## 2021-12-31 DIAGNOSIS — Z13.220 SCREENING FOR LIPID DISORDERS: ICD-10-CM

## 2021-12-31 DIAGNOSIS — Z00.00 ROUTINE HEALTH MAINTENANCE: ICD-10-CM

## 2021-12-31 DIAGNOSIS — E03.4 HYPOTHYROIDISM DUE TO ACQUIRED ATROPHY OF THYROID: ICD-10-CM

## 2021-12-31 DIAGNOSIS — D50.8 IRON DEFICIENCY ANEMIA SECONDARY TO INADEQUATE DIETARY IRON INTAKE: ICD-10-CM

## 2021-12-31 DIAGNOSIS — E55.9 VITAMIN D INSUFFICIENCY: ICD-10-CM

## 2021-12-31 RX ORDER — LEVOTHYROXINE SODIUM 0.05 MG/1
50 TABLET ORAL
Qty: 90 TABLET | Refills: 2 | Status: SHIPPED | OUTPATIENT
Start: 2021-12-31 | End: 2023-01-02

## 2021-12-31 NOTE — PROGRESS NOTES
1. Hypothyroidism due to acquired atrophy of thyroid  - levothyroxine (SYNTHROID) 50 MCG Tab; Take 1 Tablet by mouth every morning on an empty stomach.  Dispense: 90 Tablet; Refill: 2  - TSH WITH REFLEX TO FT4; Future    2. Routine health maintenance  - CBC WITH DIFFERENTIAL; Future  - Comp Metabolic Panel; Future  - Lipid Profile; Future  - TSH WITH REFLEX TO FT4; Future  - VITAMIN D,25 HYDROXY; Future    3. Iron deficiency anemia secondary to inadequate dietary iron intake  - CBC WITH DIFFERENTIAL; Future  - Comp Metabolic Panel; Future  - FERRITIN; Future  - IRON/TOTAL IRON BIND; Future    4. Vitamin D insufficiency  - VITAMIN D,25 HYDROXY; Future    5. Screening for lipid disorders  - Comp Metabolic Panel; Future  - Lipid Profile; Future

## 2022-07-11 DIAGNOSIS — N63.20 LEFT BREAST LUMP: ICD-10-CM

## 2022-07-11 NOTE — PROGRESS NOTES
1. Left breast lump  Patient reports completing monthly breast exam this past weekend and noting a pea-sized lump at 3:00 on her left breast.  Plan to do left breast diagnostic mammogram.  - MA-DIAGNOSTIC MAMMO LEFT W/TOMOSYNTHESIS W/CAD; Future

## 2022-07-28 ENCOUNTER — HOSPITAL ENCOUNTER (OUTPATIENT)
Dept: RADIOLOGY | Facility: MEDICAL CENTER | Age: 42
End: 2022-07-28
Attending: NURSE PRACTITIONER
Payer: COMMERCIAL

## 2022-07-28 DIAGNOSIS — N63.20 LEFT BREAST LUMP: ICD-10-CM

## 2022-07-28 PROCEDURE — 76642 ULTRASOUND BREAST LIMITED: CPT | Mod: LT

## 2022-07-28 PROCEDURE — G0279 TOMOSYNTHESIS, MAMMO: HCPCS

## 2022-08-09 ENCOUNTER — HOSPITAL ENCOUNTER (OUTPATIENT)
Dept: RADIOLOGY | Facility: MEDICAL CENTER | Age: 42
End: 2022-08-09
Attending: NURSE PRACTITIONER
Payer: COMMERCIAL

## 2022-08-09 DIAGNOSIS — R92.8 ABNORMAL FINDING ON BREAST IMAGING: ICD-10-CM

## 2022-08-09 LAB — PATHOLOGY CONSULT NOTE: NORMAL

## 2022-08-09 PROCEDURE — 19083 BX BREAST 1ST LESION US IMAG: CPT | Mod: LT

## 2022-08-09 PROCEDURE — 88305 TISSUE EXAM BY PATHOLOGIST: CPT

## 2022-08-10 ENCOUNTER — TELEPHONE (OUTPATIENT)
Dept: RADIOLOGY | Facility: MEDICAL CENTER | Age: 42
End: 2022-08-10
Payer: COMMERCIAL

## 2022-08-19 ENCOUNTER — TELEPHONE (OUTPATIENT)
Dept: RADIOLOGY | Facility: MEDICAL CENTER | Age: 42
End: 2022-08-19
Payer: COMMERCIAL

## 2022-10-27 ENCOUNTER — OFFICE VISIT (OUTPATIENT)
Dept: MEDICAL GROUP | Facility: PHYSICIAN GROUP | Age: 42
End: 2022-10-27
Payer: COMMERCIAL

## 2022-10-27 VITALS
BODY MASS INDEX: 25.95 KG/M2 | WEIGHT: 141 LBS | DIASTOLIC BLOOD PRESSURE: 56 MMHG | OXYGEN SATURATION: 100 % | TEMPERATURE: 97.8 F | SYSTOLIC BLOOD PRESSURE: 110 MMHG | HEIGHT: 62 IN | HEART RATE: 75 BPM

## 2022-10-27 DIAGNOSIS — Z00.00 ENCOUNTER FOR WELL ADULT EXAM WITHOUT ABNORMAL FINDINGS: ICD-10-CM

## 2022-10-27 DIAGNOSIS — Z23 NEED FOR VACCINATION: ICD-10-CM

## 2022-10-27 DIAGNOSIS — F41.9 ANXIETY: ICD-10-CM

## 2022-10-27 PROCEDURE — 90471 IMMUNIZATION ADMIN: CPT | Performed by: NURSE PRACTITIONER

## 2022-10-27 PROCEDURE — 90746 HEPB VACCINE 3 DOSE ADULT IM: CPT | Performed by: NURSE PRACTITIONER

## 2022-10-27 PROCEDURE — 99396 PREV VISIT EST AGE 40-64: CPT | Mod: 25 | Performed by: NURSE PRACTITIONER

## 2022-10-27 PROCEDURE — 90472 IMMUNIZATION ADMIN EACH ADD: CPT | Performed by: NURSE PRACTITIONER

## 2022-10-27 PROCEDURE — 90686 IIV4 VACC NO PRSV 0.5 ML IM: CPT | Performed by: NURSE PRACTITIONER

## 2022-10-27 ASSESSMENT — FIBROSIS 4 INDEX: FIB4 SCORE: 1.12

## 2022-10-27 ASSESSMENT — PATIENT HEALTH QUESTIONNAIRE - PHQ9: CLINICAL INTERPRETATION OF PHQ2 SCORE: 0

## 2022-10-28 NOTE — PROGRESS NOTES
Subjective:     CC:   Chief Complaint   Patient presents with    Annual Exam    Anxiety     HPI:   Gasper Bates is a 42 y.o. female who presents for annual exam    Anxiety  Chronic, ongoing problem for the patient that is worsening. Reports that she has always had some degree of anxiety. Has never taken medication for this. Reports increased work demands and stress. She is noticing that she is more irritable and short with her family, friends, and co-workers. Realized after hearing her son repeat her words in her irritated tone that her anxiety is affecting her home life and she is ready to try medication, interested in sertraline.     Patient has GYN provider: Yes  Last Pap Smear: 2021   H/O Abnormal Pap: Yes, HPV  Last Mammogram: 2022  Last Bone Density Test: NA  Last Colorectal Cancer Screening: NA  Last Tdap:   Received HPV series: No    Exercise: Not as much as previous.  Diet: okay, could be better.      No LMP recorded.  Hx STDs: No  Birth control: Fiance with vasectomy.  Menses every month with 3 days with light bleeding.  Denies cramping.  No significant bloating/fluid retention, pelvic pain, or dyspareunia. No abnormal vaginal discharge.  No breast tenderness, mass, nipple discharge, changes in size or contour, or abnormal cyclic discomfort.    OB History    Para Term  AB Living   2 1 0 1 1 2   SAB IAB Ectopic Molar Multiple Live Births   0 1 0 0 2 2      She  reports being sexually active and has had partner(s) who are male. She reports using the following method of birth control/protection: Male Sterilization.  She  has a past medical history of Abnormal Pap smear of cervix, Anemia, Benign gestational thrombocytopenia in third trimester (HCC) (2016), Disorder of thyroid, Migraine, and Vitamin D insufficiency.  She  has a past surgical history that includes hysteroscopy with video diagnostic (2015); primary c section (2016); and colposcopy.    Family History    Problem Relation Age of Onset    No Known Problems Mother     Alcohol/Drug Father     Diabetes Sister     Hyperlipidemia Sister     No Known Problems Maternal Aunt     No Known Problems Maternal Uncle     No Known Problems Maternal Grandmother     No Known Problems Maternal Grandfather     No Known Problems Paternal Grandmother     Kidney Disease Paternal Grandfather     Diabetes Paternal Grandfather      Social History     Tobacco Use    Smoking status: Never    Smokeless tobacco: Never   Vaping Use    Vaping Use: Never used   Substance Use Topics    Alcohol use: Yes     Alcohol/week: 1.2 oz     Types: 2 Standard drinks or equivalent per week     Comment: occ    Drug use: No       Patient Active Problem List    Diagnosis Date Noted    Anxiety 10/29/2022    Skin lesion 10/25/2021    Onychomycosis 03/25/2021    Vitamin D insufficiency 11/27/2018    Acquired hypothyroidism 11/27/2018    Iron deficiency anemia secondary to inadequate dietary iron intake 11/27/2018    Migraine with aura and without status migrainosus, not intractable 04/12/2018     Current Outpatient Medications   Medication Sig Dispense Refill    sertraline (ZOLOFT) 50 MG Tab Take 1 Tablet by mouth every day. 90 Tablet 0    levothyroxine (SYNTHROID) 50 MCG Tab Take 1 Tablet by mouth every morning on an empty stomach. 90 Tablet 2    vitamin D (CHOLECALCIFEROL) 1000 UNIT Tab Take 5,000 Units by mouth every day.      Cyanocobalamin (VITAMIN B12 PO) Take 1 Tab by mouth every day.      Omega-3 Fatty Acids (FISH OIL) 1000 MG Cap capsule Take 1,000 mg by mouth every day.       No current facility-administered medications for this visit.     No Known Allergies    Review of Systems   Constitutional: Negative for fever, chills and malaise/fatigue.   HENT: Negative for congestion.    Eyes: Negative for pain.   Respiratory: Negative for cough and shortness of breath.    Cardiovascular: Negative for chest pain and leg swelling.   Gastrointestinal: Negative for  "nausea, vomiting, abdominal pain and diarrhea.   Genitourinary: Negative for dysuria and hematuria.   Skin: Negative for rash.   Neurological: Negative for dizziness, focal weakness and headaches.   Endo/Heme/Allergies: Does not bruise/bleed easily.   Psychiatric/Behavioral: + anxiety. Negative for depression.  The patient is not nervous/anxious.      Objective:   /56 (BP Location: Left arm, Patient Position: Sitting, BP Cuff Size: Adult)   Pulse 75   Temp 36.6 °C (97.8 °F) (Temporal)   Ht 1.575 m (5' 2\")   Wt 64 kg (141 lb)   SpO2 100%   BMI 25.79 kg/m²     Wt Readings from Last 4 Encounters:   10/27/22 64 kg (141 lb)   10/25/21 62.1 kg (137 lb)   01/13/20 62.6 kg (138 lb)   03/14/19 59.1 kg (130 lb 6.4 oz)     Physical Exam:  Constitutional: Well-developed and well-nourished. Not diaphoretic. No distress.   Skin: Skin is warm and dry. No rash noted.  Head: Atraumatic without lesions.  Eyes: Conjunctivae and extraocular motions are normal. Pupils are equal, round, and reactive to light. No scleral icterus.   Ears:  External ears unremarkable. Tympanic membranes clear and intact.  Nose: Nares patent. Septum midline. Turbinates without erythema nor edema. No discharge.   Mouth/Throat: Tongue normal. Oropharynx is clear and moist. Posterior pharynx without erythema or exudates.  Neck: Supple, trachea midline. Normal range of motion. No thyromegaly present. No lymphadenopathy--cervical or supraclavicular.  Cardiovascular: Regular rate and rhythm, S1 and S2 without murmur, rubs, or gallops.    Respiratory: Effort normal. Clear to auscultation throughout. No adventitious sounds.   Breast:  Breast exam deferred. Discussed monthly self exams and what to look for, including peau d'orange or nipple retraction, discharge, breasts moving freely and equally without restriction, axillary/supraclavicular adenopathy, or palpable masses/nodules.  Abdomen: Soft, non tender, and without distention. Active bowel sounds in " all four quadrants. No rebound, guarding.  Extremities: No cyanosis, clubbing, erythema, nor edema. Radial pulses intact and symmetric.   Musculoskeletal: All major joints AROM full in all directions without pain.  Neurological: Alert and oriented x 3. Grossly non-focal. Strength and sensation grossly intact.  Psychiatric:  Behavior, mood, and affect are appropriate.    Assessment and Plan:   1. Encounter for well adult exam without abnormal findings    2. Anxiety  Chronic, worsening. Start sertraline 50 mg/day.  Discussed possible side effects including nausea and dizziness that tend to resolve/improve after couple of weeks on the medication. Advised patient that it can take 6-12 weeks for medication to take full effect. Medication should not be stopped abruptly, would need to be titrated off if deciding to not continue medication.   Return to clinic sooner for symptoms including but not limited to: worsening depression, suicidal ideation, anxiety, agitation, panic attacks, insomnia, irritability, hostility, aggressiveness, impulsivity, hypomania and amita  If such symptoms are observed, you or family need to contact us immediately, and consider calling the [employer] [employer] ([phone]) or 157, or transporting patient to the emergency department for immediate evaluation.   SSRI Black Box Warnings include: Anxiety, agitation, panic attacks, insomnia, irritability, hostility, aggressiveness, impulsivity, hypomania and amita have been reported in adult and pediatric patients being treated with SSRI for major depressive disorder as well as for other indications.   - sertraline (ZOLOFT) 50 MG Tab; Take 1 Tablet by mouth every day.  Dispense: 90 Tablet; Refill: 0    3. Need for vaccination  Given today.  - INFLUENZA VACCINE QUAD INJ (PF)  - Hep B Adult 20+     Health maintenance: up to date   Labs per orders  Immunizations per orders  Patient counseled about skin care, diet, supplements, and  exercise.  Discussed  breast self exam, mammography screening, family planning choices, osteoporosis, adequate intake of calcium and vitamin D, diet and exercise, colorectal cancer screening.     Follow-up: Return in about 1 year (around 10/27/2023) for Preventative Annual, Follow up Labs, As needed.     I have placed the below orders and discussed them with an approved delegating provider. The MA is performing the below orders under the direction of Dr. Mari.      Please note that this dictation was created using voice recognition software. I have worked with consultants from the vendor as well as technical experts from [employer] to optimize the interface. I have made every reasonable attempt to correct obvious errors, but I expect that there are errors of grammar and possibly content that I did not discover before finalizing the note.

## 2022-10-29 PROBLEM — F41.9 ANXIETY: Status: ACTIVE | Noted: 2022-10-29

## 2022-10-30 NOTE — ASSESSMENT & PLAN NOTE
Chronic, ongoing problem for the patient that is worsening. Reports that she has always had some degree of anxiety. Has never taken medication for this. Reports increased work demands and stress. She is noticing that she is more irritable and short with her family, friends, and co-workers. Realized after hearing her son repeat her words in her irritated tone that her anxiety is affecting her home life and she is ready to try medication, interested in sertraline.

## 2022-12-30 ENCOUNTER — HOSPITAL ENCOUNTER (OUTPATIENT)
Dept: LAB | Facility: MEDICAL CENTER | Age: 42
End: 2022-12-30
Attending: NURSE PRACTITIONER
Payer: COMMERCIAL

## 2022-12-30 DIAGNOSIS — Z00.00 ROUTINE HEALTH MAINTENANCE: ICD-10-CM

## 2022-12-30 DIAGNOSIS — E03.4 HYPOTHYROIDISM DUE TO ACQUIRED ATROPHY OF THYROID: ICD-10-CM

## 2022-12-30 DIAGNOSIS — D50.8 IRON DEFICIENCY ANEMIA SECONDARY TO INADEQUATE DIETARY IRON INTAKE: ICD-10-CM

## 2022-12-30 DIAGNOSIS — Z13.220 SCREENING FOR LIPID DISORDERS: ICD-10-CM

## 2022-12-30 DIAGNOSIS — E55.9 VITAMIN D INSUFFICIENCY: ICD-10-CM

## 2022-12-30 LAB
25(OH)D3 SERPL-MCNC: 27 NG/ML (ref 30–100)
ALBUMIN SERPL BCP-MCNC: 4.1 G/DL (ref 3.2–4.9)
ALBUMIN/GLOB SERPL: 1.6 G/DL
ALP SERPL-CCNC: 47 U/L (ref 30–99)
ALT SERPL-CCNC: 16 U/L (ref 2–50)
ANION GAP SERPL CALC-SCNC: 9 MMOL/L (ref 7–16)
AST SERPL-CCNC: 18 U/L (ref 12–45)
BASOPHILS # BLD AUTO: 1 % (ref 0–1.8)
BASOPHILS # BLD: 0.04 K/UL (ref 0–0.12)
BILIRUB SERPL-MCNC: 0.4 MG/DL (ref 0.1–1.5)
BUN SERPL-MCNC: 16 MG/DL (ref 8–22)
CALCIUM ALBUM COR SERPL-MCNC: 8.9 MG/DL (ref 8.5–10.5)
CALCIUM SERPL-MCNC: 9 MG/DL (ref 8.5–10.5)
CHLORIDE SERPL-SCNC: 106 MMOL/L (ref 96–112)
CHOLEST SERPL-MCNC: 170 MG/DL (ref 100–199)
CO2 SERPL-SCNC: 24 MMOL/L (ref 20–33)
CREAT SERPL-MCNC: 0.83 MG/DL (ref 0.5–1.4)
EOSINOPHIL # BLD AUTO: 0.07 K/UL (ref 0–0.51)
EOSINOPHIL NFR BLD: 1.7 % (ref 0–6.9)
ERYTHROCYTE [DISTWIDTH] IN BLOOD BY AUTOMATED COUNT: 48 FL (ref 35.9–50)
FASTING STATUS PATIENT QL REPORTED: NORMAL
FERRITIN SERPL-MCNC: 60.9 NG/ML (ref 10–291)
GFR SERPLBLD CREATININE-BSD FMLA CKD-EPI: 90 ML/MIN/1.73 M 2
GLOBULIN SER CALC-MCNC: 2.5 G/DL (ref 1.9–3.5)
GLUCOSE SERPL-MCNC: 87 MG/DL (ref 65–99)
HCT VFR BLD AUTO: 37.1 % (ref 37–47)
HDLC SERPL-MCNC: 67 MG/DL
HGB BLD-MCNC: 12 G/DL (ref 12–16)
IMM GRANULOCYTES # BLD AUTO: 0.01 K/UL (ref 0–0.11)
IMM GRANULOCYTES NFR BLD AUTO: 0.2 % (ref 0–0.9)
IRON SATN MFR SERPL: 32 % (ref 15–55)
IRON SERPL-MCNC: 90 UG/DL (ref 40–170)
LDLC SERPL CALC-MCNC: 89 MG/DL
LYMPHOCYTES # BLD AUTO: 1.53 K/UL (ref 1–4.8)
LYMPHOCYTES NFR BLD: 37 % (ref 22–41)
MCH RBC QN AUTO: 32.4 PG (ref 27–33)
MCHC RBC AUTO-ENTMCNC: 32.3 G/DL (ref 33.6–35)
MCV RBC AUTO: 100.3 FL (ref 81.4–97.8)
MONOCYTES # BLD AUTO: 0.32 K/UL (ref 0–0.85)
MONOCYTES NFR BLD AUTO: 7.7 % (ref 0–13.4)
NEUTROPHILS # BLD AUTO: 2.16 K/UL (ref 2–7.15)
NEUTROPHILS NFR BLD: 52.4 % (ref 44–72)
NRBC # BLD AUTO: 0 K/UL
NRBC BLD-RTO: 0 /100 WBC
PLATELET # BLD AUTO: 149 K/UL (ref 164–446)
PMV BLD AUTO: 9.2 FL (ref 9–12.9)
POTASSIUM SERPL-SCNC: 4.1 MMOL/L (ref 3.6–5.5)
PROT SERPL-MCNC: 6.6 G/DL (ref 6–8.2)
RBC # BLD AUTO: 3.7 M/UL (ref 4.2–5.4)
SODIUM SERPL-SCNC: 139 MMOL/L (ref 135–145)
TIBC SERPL-MCNC: 280 UG/DL (ref 250–450)
TRIGL SERPL-MCNC: 71 MG/DL (ref 0–149)
TSH SERPL DL<=0.005 MIU/L-ACNC: 2.45 UIU/ML (ref 0.38–5.33)
UIBC SERPL-MCNC: 190 UG/DL (ref 110–370)
WBC # BLD AUTO: 4.1 K/UL (ref 4.8–10.8)

## 2022-12-30 PROCEDURE — 82728 ASSAY OF FERRITIN: CPT

## 2022-12-30 PROCEDURE — 36415 COLL VENOUS BLD VENIPUNCTURE: CPT

## 2022-12-30 PROCEDURE — 82306 VITAMIN D 25 HYDROXY: CPT

## 2022-12-30 PROCEDURE — 84443 ASSAY THYROID STIM HORMONE: CPT

## 2022-12-30 PROCEDURE — 80053 COMPREHEN METABOLIC PANEL: CPT

## 2022-12-30 PROCEDURE — 80061 LIPID PANEL: CPT

## 2022-12-30 PROCEDURE — 83550 IRON BINDING TEST: CPT

## 2022-12-30 PROCEDURE — 85025 COMPLETE CBC W/AUTO DIFF WBC: CPT

## 2022-12-30 PROCEDURE — 83540 ASSAY OF IRON: CPT

## 2023-01-01 DIAGNOSIS — E03.4 HYPOTHYROIDISM DUE TO ACQUIRED ATROPHY OF THYROID: ICD-10-CM

## 2023-01-02 DIAGNOSIS — D50.8 IRON DEFICIENCY ANEMIA SECONDARY TO INADEQUATE DIETARY IRON INTAKE: ICD-10-CM

## 2023-01-02 DIAGNOSIS — E78.5 DYSLIPIDEMIA: ICD-10-CM

## 2023-01-02 DIAGNOSIS — R79.89 ABNORMAL CBC: ICD-10-CM

## 2023-01-02 DIAGNOSIS — E55.9 VITAMIN D INSUFFICIENCY: ICD-10-CM

## 2023-01-02 DIAGNOSIS — Z00.00 ROUTINE HEALTH MAINTENANCE: ICD-10-CM

## 2023-01-02 DIAGNOSIS — E03.9 ACQUIRED HYPOTHYROIDISM: ICD-10-CM

## 2023-01-02 RX ORDER — LEVOTHYROXINE SODIUM 0.05 MG/1
TABLET ORAL
Qty: 90 TABLET | Refills: 3 | Status: SHIPPED | OUTPATIENT
Start: 2023-01-02 | End: 2023-10-23 | Stop reason: SDUPTHER

## 2023-01-02 NOTE — PROGRESS NOTES
1. Routine health maintenance  - CBC WITH DIFFERENTIAL; Future  - Comp Metabolic Panel; Future  - FERRITIN; Future  - IRON/TOTAL IRON BIND; Future  - Lipid Profile; Future  - TSH WITH REFLEX TO FT4; Future  - VITAMIN D,25 HYDROXY (DEFICIENCY); Future    2. Acquired hypothyroidism  - TSH WITH REFLEX TO FT4; Future    3. Iron deficiency anemia secondary to inadequate dietary iron intake  - CBC WITH DIFFERENTIAL; Future  - FERRITIN; Future  - IRON/TOTAL IRON BIND; Future    4. Vitamin D insufficiency  - VITAMIN D,25 HYDROXY (DEFICIENCY); Future    5. Abnormal CBC  - CBC WITH DIFFERENTIAL; Future  - FERRITIN; Future  - IRON/TOTAL IRON BIND; Future    6. Dyslipidemia  - Comp Metabolic Panel; Future  - Lipid Profile; Future  - TSH WITH REFLEX TO FT4; Future     Due for annual labs in December 2023.

## 2023-01-23 DIAGNOSIS — F41.9 ANXIETY: ICD-10-CM

## 2023-01-23 NOTE — TELEPHONE ENCOUNTER
Requested Prescriptions     Pending Prescriptions Disp Refills   • sertraline (ZOLOFT) 50 MG Tab [Pharmacy Med Name: SERTRALINE HCL 50 MG TABLET] 90 Tablet 3     Sig: TAKE 1 TABLET BY MOUTH EVERY DAY       LIBERTY Yang.

## 2023-07-24 DIAGNOSIS — F41.9 ANXIETY: ICD-10-CM

## 2023-07-24 NOTE — TELEPHONE ENCOUNTER
Requested Prescriptions     Pending Prescriptions Disp Refills   • sertraline (ZOLOFT) 50 MG Tab 90 Tablet 3     Sig: Take 1 Tablet by mouth every day.       LIBERTY Yang.

## 2023-07-24 NOTE — TELEPHONE ENCOUNTER
Received request via: Pharmacy    Was the patient seen in the last year in this department? Yes    Does the patient have an active prescription (recently filled or refills available) for medication(s) requested?  Patient is requesting new refill for [email or web address] pharmacy mail order. I updated patients chart.     Does the patient have correction Plus and need 100 day supply (blood pressure, diabetes and cholesterol meds only)? Patient does not have SCP

## 2023-10-13 ENCOUNTER — HOSPITAL ENCOUNTER (OUTPATIENT)
Dept: RADIOLOGY | Facility: MEDICAL CENTER | Age: 43
End: 2023-10-13
Attending: NURSE PRACTITIONER
Payer: COMMERCIAL

## 2023-10-13 DIAGNOSIS — Z12.31 VISIT FOR SCREENING MAMMOGRAM: ICD-10-CM

## 2023-10-23 ENCOUNTER — PATIENT MESSAGE (OUTPATIENT)
Dept: MEDICAL GROUP | Facility: PHYSICIAN GROUP | Age: 43
End: 2023-10-23
Payer: COMMERCIAL

## 2023-10-23 DIAGNOSIS — E03.4 HYPOTHYROIDISM DUE TO ACQUIRED ATROPHY OF THYROID: ICD-10-CM

## 2023-10-23 DIAGNOSIS — N63.20 MASS OF LEFT BREAST, UNSPECIFIED QUADRANT: ICD-10-CM

## 2023-10-23 RX ORDER — LEVOTHYROXINE SODIUM 0.05 MG/1
50 TABLET ORAL
Qty: 90 TABLET | Refills: 0 | Status: SHIPPED | OUTPATIENT
Start: 2023-10-23 | End: 2024-01-03

## 2023-10-23 NOTE — TELEPHONE ENCOUNTER
Requested Prescriptions     Pending Prescriptions Disp Refills   • levothyroxine (SYNTHROID) 50 MCG Tab 90 Tablet 0     Sig: Take 1 Tablet by mouth every morning on an empty stomach.

## 2023-10-23 NOTE — TELEPHONE ENCOUNTER
Received request via: Pharmacy    Was the patient seen in the last year in this department? Yes    Does the patient have an active prescription (recently filled or refills available) for medication(s) requested?  Pt is requesting RX from new mail order pharmacy, [facility].    Does the patient have FDC Plus and need 100 day supply (blood pressure, diabetes and cholesterol meds only)? Patient does not have SCP

## 2023-11-03 ENCOUNTER — HOSPITAL ENCOUNTER (OUTPATIENT)
Dept: RADIOLOGY | Facility: MEDICAL CENTER | Age: 43
End: 2023-11-03
Attending: NURSE PRACTITIONER
Payer: COMMERCIAL

## 2023-11-03 DIAGNOSIS — N63.20 MASS OF LEFT BREAST, UNSPECIFIED QUADRANT: ICD-10-CM

## 2023-11-03 PROCEDURE — G0279 TOMOSYNTHESIS, MAMMO: HCPCS

## 2023-11-03 PROCEDURE — 76642 ULTRASOUND BREAST LIMITED: CPT | Mod: RT

## 2023-12-18 ENCOUNTER — OFFICE VISIT (OUTPATIENT)
Dept: MEDICAL GROUP | Facility: PHYSICIAN GROUP | Age: 43
End: 2023-12-18
Payer: COMMERCIAL

## 2023-12-18 VITALS
HEIGHT: 62 IN | RESPIRATION RATE: 14 BRPM | HEART RATE: 107 BPM | SYSTOLIC BLOOD PRESSURE: 100 MMHG | WEIGHT: 143 LBS | TEMPERATURE: 97.5 F | BODY MASS INDEX: 26.31 KG/M2 | DIASTOLIC BLOOD PRESSURE: 54 MMHG | OXYGEN SATURATION: 96 %

## 2023-12-18 DIAGNOSIS — Z00.00 ENCOUNTER FOR WELL ADULT EXAM WITHOUT ABNORMAL FINDINGS: ICD-10-CM

## 2023-12-18 DIAGNOSIS — R92.8 ABNORMAL MAMMOGRAM OF BOTH BREASTS: ICD-10-CM

## 2023-12-18 PROCEDURE — 3078F DIAST BP <80 MM HG: CPT | Performed by: NURSE PRACTITIONER

## 2023-12-18 PROCEDURE — 99396 PREV VISIT EST AGE 40-64: CPT | Performed by: NURSE PRACTITIONER

## 2023-12-18 PROCEDURE — 3074F SYST BP LT 130 MM HG: CPT | Performed by: NURSE PRACTITIONER

## 2023-12-18 ASSESSMENT — PATIENT HEALTH QUESTIONNAIRE - PHQ9: CLINICAL INTERPRETATION OF PHQ2 SCORE: 0

## 2023-12-18 ASSESSMENT — FIBROSIS 4 INDEX: FIB4 SCORE: 1.3

## 2023-12-18 NOTE — PROGRESS NOTES
Subjective:   CC:   Chief Complaint   Patient presents with    Annual Exam     HPI:   Gasper Bates is a 43 y.o. female who presents for annual exam:    Anticipatory Guidance:  Cholesterol screenin2022   LDL controlled: 89  Diabetes screenin2022  Diet: Recommend more lean meats, fruits, vegetables, whole grains.   Exercise: Encourage regular exercise. Exercises 4 days a week.  Substance abuse: No  Safe in relationship: Yes  Seatbelts, bike/motorcycle helmet: Yes  Sun protection: Yes  Dentist: Up to date   Eye doctor: Due for follow up    Cancer Screening:  Colorectal cancer screening: NA due at age 45  Cervical cancer screenin2021, due 2024  Breast cancer screening: Diagnostic mammo completed 2023, due for repeat diagnostic in May 2024.    Infectious Disease Screening/Immunizations:  STI screening: Declines  Chlamydia/Gonorrhea screening: Declines  Hep C screenin2016  HIV screen: 2016  Practices safe sex: Yes    Immunizations:   Influenza: Declines   Tetanus: 2018   Pneumonia: NA due at age 65  RSV: NA  Shingrix: Na    Received HPV series: No    Preventative Care Screening:   Osteoporosis Screening: NA, due at age 65  Tobacco Screening: Never smoker     AAA Screening: NA    Patient's last menstrual period was 2023.  Hx STDs: Yes, HPV  Birth control: Vasectomy  Menses every month with 2 days with light bleeding.  Denies cramping.  No significant bloating/fluid retention, pelvic pain, or dyspareunia. No abnormal vaginal discharge.  No breast tenderness, mass, nipple discharge, changes in size or contour, or abnormal cyclic discomfort.    OB History    Para Term  AB Living   2 1 0 1 1 2   SAB IAB Ectopic Molar Multiple Live Births   0 1 0 0 2 2     She  reports being sexually active and has had partner(s) who are male. She reports using the following method of birth control/protection: Male Sterilization.  She  has a past medical history of  Abnormal Pap smear of cervix, Anemia, Benign gestational thrombocytopenia in third trimester (HCC) (7/6/2016), Disorder of thyroid, Migraine, and Vitamin D insufficiency.  She  has a past surgical history that includes hysteroscopy with video diagnostic (11/5/2015); primary c section (7/2/2016); and colposcopy.    Family History   Problem Relation Age of Onset    No Known Problems Mother     Alcohol/Drug Father     Diabetes Sister     Hyperlipidemia Sister     No Known Problems Maternal Aunt     No Known Problems Maternal Uncle     No Known Problems Maternal Grandmother     No Known Problems Maternal Grandfather     No Known Problems Paternal Grandmother     Kidney Disease Paternal Grandfather     Diabetes Paternal Grandfather      Social History     Tobacco Use    Smoking status: Never    Smokeless tobacco: Never   Vaping Use    Vaping Use: Never used   Substance Use Topics    Alcohol use: Yes     Alcohol/week: 1.2 oz     Types: 2 Standard drinks or equivalent per week     Comment: occ    Drug use: No     Patient Active Problem List    Diagnosis Date Noted    Anxiety 10/29/2022    Skin lesion 10/25/2021    Onychomycosis 03/25/2021    Vitamin D insufficiency 11/27/2018    Acquired hypothyroidism 11/27/2018    Iron deficiency anemia secondary to inadequate dietary iron intake 11/27/2018    Migraine with aura and without status migrainosus, not intractable 04/12/2018     Current Outpatient Medications   Medication Sig Dispense Refill    levothyroxine (SYNTHROID) 50 MCG Tab Take 1 Tablet by mouth every morning on an empty stomach. 90 Tablet 0    sertraline (ZOLOFT) 50 MG Tab Take 1 Tablet by mouth every day. 90 Tablet 3    vitamin D (CHOLECALCIFEROL) 1000 UNIT Tab Take 5,000 Units by mouth every day.      Cyanocobalamin (VITAMIN B12 PO) Take 1 Tab by mouth every day.      Omega-3 Fatty Acids (FISH OIL) 1000 MG Cap capsule Take 1,000 mg by mouth every day.       No current facility-administered medications for this  "visit.     No Known Allergies    Review of Systems   Constitutional: Negative for fever, chills and malaise/fatigue.   HENT: Negative for congestion.    Eyes: Negative for pain.   Respiratory: Negative for cough and shortness of breath.    Cardiovascular: Negative for chest pain and leg swelling.   Gastrointestinal: Negative for nausea, vomiting, abdominal pain and diarrhea.   Genitourinary: Negative for dysuria and hematuria.   Skin: Negative for rash.   Neurological: Negative for dizziness, focal weakness and headaches.   Endo/Heme/Allergies: Does not bruise/bleed easily.   Psychiatric/Behavioral: Negative for depression.  The patient is not nervous/anxious.      Objective:   /54 (BP Location: Left arm, Patient Position: Sitting, BP Cuff Size: Large adult)   Pulse (!) 107   Temp 36.4 °C (97.5 °F) (Temporal)   Resp 14   Ht 1.575 m (5' 2\")   Wt 64.9 kg (143 lb)   LMP 12/04/2023   SpO2 96%   BMI 26.16 kg/m²     Wt Readings from Last 4 Encounters:   12/18/23 64.9 kg (143 lb)   10/27/22 64 kg (141 lb)   10/25/21 62.1 kg (137 lb)   01/13/20 62.6 kg (138 lb)     Physical Exam:  Constitutional: Well-developed and well-nourished. Not diaphoretic. No distress.   Skin: Skin is warm and dry. No rash noted.  Head: Atraumatic without lesions.  Eyes: Conjunctivae and extraocular motions are normal. Pupils are equal, round, and reactive to light. No scleral icterus.   Ears:  External ears unremarkable. Tympanic membranes clear and intact.  Nose: Nares patent. Septum midline. Turbinates without erythema nor edema. No discharge.   Mouth/Throat: Tongue normal. Oropharynx is clear and moist. Posterior pharynx without erythema or exudates.  Neck: Supple, trachea midline. Normal range of motion. No thyromegaly present. No lymphadenopathy--cervical or supraclavicular.  Cardiovascular: Regular rate and rhythm, S1 and S2 without murmur, rubs, or gallops.    Respiratory: Effort normal. Clear to auscultation throughout. No " adventitious sounds.   Breast:  Breast exam deferred. Discussed monthly self exams and what to look for, including peau d'orange or nipple retraction, discharge, breasts moving freely and equally without restriction, axillary/supraclavicular adenopathy, or palpable masses/nodules.  Abdomen: Soft, non tender, and without distention. Active bowel sounds in all four quadrants. No rebound, guarding.  Extremities: No cyanosis, clubbing, erythema, nor edema. Radial pulses intact and symmetric.   Musculoskeletal: All major joints AROM full in all directions without pain.  Neurological: Alert and oriented x 3. Grossly non-focal. Strength and sensation grossly intact.   Psychiatric:  Behavior, mood, and affect are appropriate.    Assessment and Plan:   1. Encounter for well adult exam without abnormal findings  Presents today for annual exam with no complaints. Due for updated annual labs, previously ordered, patient to complete and will notify of results through [employer] when received. Plan for patient to complete annual labs in December prior to annual follow up.    2. Abnormal mammogram of both breasts  Patient completed diagnostic mammogram, history of abnormal mammograms of both breasts, due for repeat diagnostic mammogram in May 2024 for 6 month follow up.  - MA-DIAGNOSTIC MAMMO BILAT W/TOMOSYNTHESIS W/CAD; Future     Health maintenance: Up to date   Labs per orders  Immunizations: Declines   Patient counseled about skin care, diet, supplements, and exercise.  Discussed  breast self exam, mammography screening, family planning choices, osteoporosis, adequate intake of calcium and vitamin D, diet and exercise, colorectal cancer screening.     Follow-up: Return in about 1 year (around 12/18/2024) for Preventative Annual, Follow up Labs.     Please note that this dictation was created using voice recognition software. I have worked with consultants from the vendor as well as technical experts from [employer] to optimize  the interface. I have made every reasonable attempt to correct obvious errors, but I expect that there are errors of grammar and possibly content that I did not discover before finalizing the note.

## 2024-01-03 DIAGNOSIS — E03.4 HYPOTHYROIDISM DUE TO ACQUIRED ATROPHY OF THYROID: ICD-10-CM

## 2024-01-03 RX ORDER — LEVOTHYROXINE SODIUM 0.05 MG/1
50 TABLET ORAL
Qty: 90 TABLET | Refills: 0 | Status: SHIPPED | OUTPATIENT
Start: 2024-01-03

## 2024-01-03 NOTE — TELEPHONE ENCOUNTER
Requested Prescriptions     Pending Prescriptions Disp Refills    levothyroxine (SYNTHROID) 50 MCG Tab [Pharmacy Med Name: Levothyroxine Sodium 50mcg Tablet] 90 Tablet 0     Sig: Take 1 tablet by mouth every morning on an empty stomach.       LIBERTY Yang.

## 2024-04-30 DIAGNOSIS — E03.4 HYPOTHYROIDISM DUE TO ACQUIRED ATROPHY OF THYROID: ICD-10-CM

## 2024-05-01 RX ORDER — LEVOTHYROXINE SODIUM 0.05 MG/1
50 TABLET ORAL
Qty: 90 TABLET | Refills: 2 | Status: SHIPPED | OUTPATIENT
Start: 2024-05-01

## 2024-05-01 NOTE — TELEPHONE ENCOUNTER
Received request via: Pharmacy    Was the patient seen in the last year in this department? Yes    Does the patient have an active prescription (recently filled or refills available) for medication(s) requested? No    [facility]    Does the patient have [facility] Plus and need 100 day supply (blood pressure, diabetes and cholesterol meds only)? Patient does not have SCP

## 2024-05-01 NOTE — TELEPHONE ENCOUNTER
Requested Prescriptions     Pending Prescriptions Disp Refills    levothyroxine (SYNTHROID) 50 MCG Tab [Pharmacy Med Name: Levothyroxine Sodium 50mcg Tablet] 90 Tablet 2     Sig: Take 1 tablet by mouth every morning on an empty stomach.       LIBERTY Yang.

## 2024-05-03 ENCOUNTER — TELEPHONE (OUTPATIENT)
Dept: MEDICAL GROUP | Facility: PHYSICIAN GROUP | Age: 44
End: 2024-05-03
Payer: COMMERCIAL

## 2024-05-03 NOTE — TELEPHONE ENCOUNTER
1. Caller Name: Gasper Bates                         Call Back Number: [phone]       How would the patient prefer to be contacted with a response: Telly message    Pt sent request through [employer].    2. SPECIFIC Action To Be Taken: Referral pending, please sign.    3. Diagnosis/Clinical Reason for Request: Symptoms of hormone imbalance    4. Specialty & Provider Name/Lab/Imaging Location: [facility]/ Dr. Art Alston    Comment:  Jean Galeano,  I was wondering if you would mind placing a referral for me to [facility] with Dr. Art Alston? I would like to meet with her regarding possible HRT for perimenopause symptoms including brain fog, irritability, hair loss, and changes in cycle. I have a PPO plan but they are a referral based clinic, so asked if I could reach out to you and request a referral.  Let me know if you have any questions.  Thank you!!  Gasper      5. Is appointment scheduled for requested order/referral: no    Patient was informed they will receive a return phone call from the office ONLY if there are any questions before processing their request. Advised to call back if they haven't received a call from the referral department in 5 days.

## 2024-05-04 DIAGNOSIS — N92.6 MENSTRUAL CHANGES: ICD-10-CM

## 2024-05-04 DIAGNOSIS — R41.89 BRAIN FOG: ICD-10-CM

## 2024-05-04 DIAGNOSIS — R45.4 IRRITABILITY: ICD-10-CM

## 2024-05-04 DIAGNOSIS — L65.9 HAIR LOSS: ICD-10-CM

## 2024-05-04 NOTE — PROGRESS NOTES
1. Brain fog  - Referral to Endocrinology    2. Hair loss  - Referral to Endocrinology    3. Irritability  - Referral to Endocrinology    4. Menstrual changes  - Referral to Endocrinology

## 2024-05-07 DIAGNOSIS — E55.9 VITAMIN D INSUFFICIENCY: ICD-10-CM

## 2024-05-07 DIAGNOSIS — D50.8 IRON DEFICIENCY ANEMIA SECONDARY TO INADEQUATE DIETARY IRON INTAKE: ICD-10-CM

## 2024-05-07 DIAGNOSIS — E03.9 ACQUIRED HYPOTHYROIDISM: ICD-10-CM

## 2024-05-07 DIAGNOSIS — Z00.00 ROUTINE HEALTH MAINTENANCE: ICD-10-CM

## 2024-05-07 DIAGNOSIS — Z13.220 SCREENING FOR LIPID DISORDERS: ICD-10-CM

## 2024-05-08 NOTE — PROGRESS NOTES
1. Acquired hypothyroidism  - T3 FREE; Future  - FREE THYROXINE; Future  - TSH; Future    2. Iron deficiency anemia secondary to inadequate dietary iron intake  - IRON/TOTAL IRON BIND; Future  - FERRITIN; Future  - CBC WITH DIFFERENTIAL; Future    3. Vitamin D insufficiency  - VITAMIN D,25 HYDROXY (DEFICIENCY); Future    4. Screening for lipid disorders  - TSH; Future  - Lipid Profile; Future  - Comp Metabolic Panel; Future    5. Routine health maintenance  - VITAMIN D,25 HYDROXY (DEFICIENCY); Future  - T3 FREE; Future  - FREE THYROXINE; Future  - TSH; Future  - Lipid Profile; Future  - IRON/TOTAL IRON BIND; Future  - FERRITIN; Future  - Comp Metabolic Panel; Future  - CBC WITH DIFFERENTIAL; Future

## 2024-05-17 ENCOUNTER — HOSPITAL ENCOUNTER (OUTPATIENT)
Dept: RADIOLOGY | Facility: MEDICAL CENTER | Age: 44
End: 2024-05-17
Attending: NURSE PRACTITIONER
Payer: COMMERCIAL

## 2024-05-17 DIAGNOSIS — R92.8 ABNORMAL MAMMOGRAM OF BOTH BREASTS: ICD-10-CM

## 2024-06-08 ENCOUNTER — HOSPITAL ENCOUNTER (OUTPATIENT)
Dept: LAB | Facility: MEDICAL CENTER | Age: 44
End: 2024-06-08
Attending: NURSE PRACTITIONER
Payer: COMMERCIAL

## 2024-06-08 DIAGNOSIS — Z13.220 SCREENING FOR LIPID DISORDERS: ICD-10-CM

## 2024-06-08 DIAGNOSIS — D50.8 IRON DEFICIENCY ANEMIA SECONDARY TO INADEQUATE DIETARY IRON INTAKE: ICD-10-CM

## 2024-06-08 DIAGNOSIS — E55.9 VITAMIN D INSUFFICIENCY: ICD-10-CM

## 2024-06-08 DIAGNOSIS — Z00.00 ROUTINE HEALTH MAINTENANCE: ICD-10-CM

## 2024-06-08 DIAGNOSIS — E03.9 ACQUIRED HYPOTHYROIDISM: ICD-10-CM

## 2024-06-08 LAB
25(OH)D3 SERPL-MCNC: 33 NG/ML (ref 30–100)
ALBUMIN SERPL BCP-MCNC: 4.2 G/DL (ref 3.2–4.9)
ALBUMIN/GLOB SERPL: 1.8 G/DL
ALP SERPL-CCNC: 56 U/L (ref 30–99)
ALT SERPL-CCNC: 13 U/L (ref 2–50)
ANION GAP SERPL CALC-SCNC: 11 MMOL/L (ref 7–16)
AST SERPL-CCNC: 16 U/L (ref 12–45)
BASOPHILS # BLD AUTO: 1 % (ref 0–1.8)
BASOPHILS # BLD: 0.04 K/UL (ref 0–0.12)
BILIRUB SERPL-MCNC: 0.4 MG/DL (ref 0.1–1.5)
BUN SERPL-MCNC: 24 MG/DL (ref 8–22)
CALCIUM ALBUM COR SERPL-MCNC: 8.8 MG/DL (ref 8.5–10.5)
CALCIUM SERPL-MCNC: 9 MG/DL (ref 8.5–10.5)
CHLORIDE SERPL-SCNC: 104 MMOL/L (ref 96–112)
CHOLEST SERPL-MCNC: 181 MG/DL (ref 100–199)
CO2 SERPL-SCNC: 24 MMOL/L (ref 20–33)
CREAT SERPL-MCNC: 0.78 MG/DL (ref 0.5–1.4)
EOSINOPHIL # BLD AUTO: 0.06 K/UL (ref 0–0.51)
EOSINOPHIL NFR BLD: 1.4 % (ref 0–6.9)
ERYTHROCYTE [DISTWIDTH] IN BLOOD BY AUTOMATED COUNT: 44.8 FL (ref 35.9–50)
FASTING STATUS PATIENT QL REPORTED: NORMAL
FERRITIN SERPL-MCNC: 70.5 NG/ML (ref 10–291)
GFR SERPLBLD CREATININE-BSD FMLA CKD-EPI: 96 ML/MIN/1.73 M 2
GLOBULIN SER CALC-MCNC: 2.3 G/DL (ref 1.9–3.5)
GLUCOSE SERPL-MCNC: 85 MG/DL (ref 65–99)
HCT VFR BLD AUTO: 37.2 % (ref 37–47)
HDLC SERPL-MCNC: 71 MG/DL
HGB BLD-MCNC: 12.5 G/DL (ref 12–16)
IMM GRANULOCYTES # BLD AUTO: 0.01 K/UL (ref 0–0.11)
IMM GRANULOCYTES NFR BLD AUTO: 0.2 % (ref 0–0.9)
IRON SATN MFR SERPL: 29 % (ref 15–55)
IRON SERPL-MCNC: 82 UG/DL (ref 40–170)
LDLC SERPL CALC-MCNC: 99 MG/DL
LYMPHOCYTES # BLD AUTO: 1.56 K/UL (ref 1–4.8)
LYMPHOCYTES NFR BLD: 37.2 % (ref 22–41)
MCH RBC QN AUTO: 32.6 PG (ref 27–33)
MCHC RBC AUTO-ENTMCNC: 33.6 G/DL (ref 32.2–35.5)
MCV RBC AUTO: 97.1 FL (ref 81.4–97.8)
MONOCYTES # BLD AUTO: 0.28 K/UL (ref 0–0.85)
MONOCYTES NFR BLD AUTO: 6.7 % (ref 0–13.4)
NEUTROPHILS # BLD AUTO: 2.24 K/UL (ref 1.82–7.42)
NEUTROPHILS NFR BLD: 53.5 % (ref 44–72)
NRBC # BLD AUTO: 0 K/UL
NRBC BLD-RTO: 0 /100 WBC (ref 0–0.2)
PLATELET # BLD AUTO: 174 K/UL (ref 164–446)
PMV BLD AUTO: 9.3 FL (ref 9–12.9)
POTASSIUM SERPL-SCNC: 4.4 MMOL/L (ref 3.6–5.5)
PROT SERPL-MCNC: 6.5 G/DL (ref 6–8.2)
RBC # BLD AUTO: 3.83 M/UL (ref 4.2–5.4)
SODIUM SERPL-SCNC: 139 MMOL/L (ref 135–145)
T3FREE SERPL-MCNC: 2.07 PG/ML (ref 2–4.4)
T4 FREE SERPL-MCNC: 1.08 NG/DL (ref 0.93–1.7)
TIBC SERPL-MCNC: 284 UG/DL (ref 250–450)
TRIGL SERPL-MCNC: 53 MG/DL (ref 0–149)
TSH SERPL DL<=0.005 MIU/L-ACNC: 2.2 UIU/ML (ref 0.38–5.33)
UIBC SERPL-MCNC: 202 UG/DL (ref 110–370)
WBC # BLD AUTO: 4.2 K/UL (ref 4.8–10.8)

## 2024-06-08 PROCEDURE — 82306 VITAMIN D 25 HYDROXY: CPT

## 2024-06-08 PROCEDURE — 36415 COLL VENOUS BLD VENIPUNCTURE: CPT

## 2024-06-08 PROCEDURE — 84443 ASSAY THYROID STIM HORMONE: CPT

## 2024-06-08 PROCEDURE — 82728 ASSAY OF FERRITIN: CPT

## 2024-06-08 PROCEDURE — 80061 LIPID PANEL: CPT

## 2024-06-08 PROCEDURE — 83550 IRON BINDING TEST: CPT

## 2024-06-08 PROCEDURE — 84439 ASSAY OF FREE THYROXINE: CPT

## 2024-06-08 PROCEDURE — 80053 COMPREHEN METABOLIC PANEL: CPT

## 2024-06-08 PROCEDURE — 84481 FREE ASSAY (FT-3): CPT

## 2024-06-08 PROCEDURE — 85025 COMPLETE CBC W/AUTO DIFF WBC: CPT

## 2024-06-08 PROCEDURE — 83540 ASSAY OF IRON: CPT

## 2024-07-02 DIAGNOSIS — F41.9 ANXIETY: ICD-10-CM

## 2025-03-26 ENCOUNTER — APPOINTMENT (OUTPATIENT)
Dept: URBAN - METROPOLITAN AREA CLINIC 22 | Facility: CLINIC | Age: 45
Setting detail: DERMATOLOGY
End: 2025-03-26

## 2025-03-26 DIAGNOSIS — L82.1 OTHER SEBORRHEIC KERATOSIS: ICD-10-CM

## 2025-03-26 DIAGNOSIS — L73.9 FOLLICULAR DISORDER, UNSPECIFIED: ICD-10-CM

## 2025-03-26 DIAGNOSIS — L81.4 OTHER MELANIN HYPERPIGMENTATION: ICD-10-CM

## 2025-03-26 DIAGNOSIS — Z71.89 OTHER SPECIFIED COUNSELING: ICD-10-CM

## 2025-03-26 DIAGNOSIS — D18.0 HEMANGIOMA: ICD-10-CM

## 2025-03-26 DIAGNOSIS — D22 MELANOCYTIC NEVI: ICD-10-CM

## 2025-03-26 PROBLEM — D18.01 HEMANGIOMA OF SKIN AND SUBCUTANEOUS TISSUE: Status: ACTIVE | Noted: 2025-03-26

## 2025-03-26 PROBLEM — L30.9 DERMATITIS, UNSPECIFIED: Status: ACTIVE | Noted: 2025-03-26

## 2025-03-26 PROBLEM — D22.5 MELANOCYTIC NEVI OF TRUNK: Status: ACTIVE | Noted: 2025-03-26

## 2025-03-26 PROCEDURE — 99203 OFFICE O/P NEW LOW 30 MIN: CPT

## 2025-03-26 PROCEDURE — ? ADDITIONAL NOTES

## 2025-03-26 PROCEDURE — ? SUNSCREEN RECOMMENDATIONS

## 2025-03-26 PROCEDURE — ? COUNSELING

## 2025-03-26 ASSESSMENT — LOCATION ZONE DERM
LOCATION ZONE: ARM
LOCATION ZONE: TRUNK

## 2025-03-26 ASSESSMENT — LOCATION SIMPLE DESCRIPTION DERM
LOCATION SIMPLE: LEFT FOREARM
LOCATION SIMPLE: LEFT ARM
LOCATION SIMPLE: ABDOMEN
LOCATION SIMPLE: LEFT UPPER BACK
LOCATION SIMPLE: RIGHT UPPER BACK

## 2025-03-26 ASSESSMENT — LOCATION DETAILED DESCRIPTION DERM
LOCATION DETAILED: LEFT PROXIMAL DORSAL FOREARM
LOCATION DETAILED: RIGHT MID-UPPER BACK
LOCATION DETAILED: LEFT LATERAL ABDOMEN
LOCATION DETAILED: LEFT INFERIOR UPPER BACK
LOCATION DETAILED: LEFT RADIAL VENTRAL WRIST
LOCATION DETAILED: PERIUMBILICAL SKIN

## 2025-03-26 NOTE — PROCEDURE: ADDITIONAL NOTES
Render Risk Assessment In Note?: no
Additional Notes: Improving per patient however was there for a few months. \\nPossible folliculitis w/residual PIH,  developing dermatofibroma , discussed differential. She will monitor and call if new concerns occur.
Detail Level: Simple

## 2025-06-05 ENCOUNTER — HOSPITAL ENCOUNTER (OUTPATIENT)
Facility: MEDICAL CENTER | Age: 45
End: 2025-06-05
Attending: PHYSICIAN ASSISTANT
Payer: COMMERCIAL

## 2025-06-05 PROCEDURE — 87624 HPV HI-RISK TYP POOLED RSLT: CPT

## 2025-06-05 PROCEDURE — 88142 CYTOPATH C/V THIN LAYER: CPT

## 2025-06-09 ENCOUNTER — HOSPITAL ENCOUNTER (OUTPATIENT)
Dept: RADIOLOGY | Facility: MEDICAL CENTER | Age: 45
End: 2025-06-09
Attending: NURSE PRACTITIONER
Payer: COMMERCIAL

## 2025-06-09 DIAGNOSIS — Z12.31 VISIT FOR SCREENING MAMMOGRAM: ICD-10-CM

## 2025-06-09 PROCEDURE — 77063 BREAST TOMOSYNTHESIS BI: CPT

## 2025-06-11 LAB
HPV I/H RISK 1 DNA SPEC QL NAA+PROBE: NOT DETECTED
SPECIMEN SOURCE: NORMAL
THINPREP PAP, CYTOLOGY NL11781: NORMAL

## 2025-06-12 ENCOUNTER — RESULTS FOLLOW-UP (OUTPATIENT)
Dept: MEDICAL GROUP | Facility: PHYSICIAN GROUP | Age: 45
End: 2025-06-12

## 2025-06-12 DIAGNOSIS — Z12.12 SCREENING FOR COLORECTAL CANCER: Primary | ICD-10-CM

## 2025-06-12 DIAGNOSIS — Z12.11 SCREENING FOR COLORECTAL CANCER: Primary | ICD-10-CM

## 2025-06-16 ENCOUNTER — APPOINTMENT (OUTPATIENT)
Dept: MEDICAL GROUP | Facility: PHYSICIAN GROUP | Age: 45
End: 2025-06-16
Payer: COMMERCIAL

## 2025-06-19 NOTE — Clinical Note
REFERRAL APPROVAL NOTICE         Sent on June 19, 2025                   Gasper Bates  [address] [facility] [address]                   Dear Ms. Bates,    After a careful review of the medical information and benefit coverage, [facility] has processed your referral. See below for additional details.    If applicable, you must be actively enrolled with your insurance for coverage of the authorized service. If you have any questions regarding your coverage, please contact your insurance directly.    REFERRAL INFORMATION   Referral #:  [id number]  Referred-To Provider    Referred-By Provider:  Gastroenterology    MARCEL Yang   [facility]      [address] [id number] [phone] [address] [facility] [address]  [phone]    Referral Start Date:  06/12/2025  Referral End Date:   06/12/2026             SCHEDULING  If you do not already have an appointment, please call [phone] to make an appointment.     MORE INFORMATION  If you do not already have a [facility] account, sign up at: [email or web address]  You can access your medical information, make appointments, see lab results, billing information, and more.  If you have questions regarding this referral, please contact  the [facility] Referrals department at:             [phone]. Monday - Friday 8:00AM - 5:00PM.     Sincerely,    [facility]

## 2025-08-27 ENCOUNTER — APPOINTMENT (OUTPATIENT)
Dept: MEDICAL GROUP | Facility: PHYSICIAN GROUP | Age: 45
End: 2025-08-27
Payer: COMMERCIAL

## 2025-08-27 PROBLEM — R79.89 LOW TESTOSTERONE LEVEL IN FEMALE: Status: ACTIVE | Noted: 2025-08-27

## 2025-08-27 ASSESSMENT — LIFESTYLE VARIABLES
HOW MANY STANDARD DRINKS CONTAINING ALCOHOL DO YOU HAVE ON A TYPICAL DAY: PATIENT DOES NOT DRINK
HOW OFTEN DO YOU HAVE SIX OR MORE DRINKS ON ONE OCCASION: NEVER
SKIP TO QUESTIONS 9-10: 1
AUDIT-C TOTAL SCORE: 0
HOW OFTEN DO YOU HAVE A DRINK CONTAINING ALCOHOL: NEVER

## 2025-08-27 ASSESSMENT — PATIENT HEALTH QUESTIONNAIRE - PHQ9: CLINICAL INTERPRETATION OF PHQ2 SCORE: 0

## 2025-08-27 ASSESSMENT — FIBROSIS 4 INDEX: FIB4 SCORE: 1.15

## 2025-09-17 ENCOUNTER — APPOINTMENT (OUTPATIENT)
Facility: MEDICAL CENTER | Age: 45
End: 2025-09-17
Payer: COMMERCIAL